# Patient Record
Sex: FEMALE | Race: WHITE | NOT HISPANIC OR LATINO | Employment: FULL TIME | URBAN - METROPOLITAN AREA
[De-identification: names, ages, dates, MRNs, and addresses within clinical notes are randomized per-mention and may not be internally consistent; named-entity substitution may affect disease eponyms.]

---

## 2023-02-25 LAB
RESPIRATORY CULTURE, CYSTIC FIBROSIS: ABNORMAL
RESPIRATORY CULTURE, CYSTIC FIBROSIS: ABNORMAL

## 2023-03-13 LAB
FUNGAL CULTURE/SMEAR: NORMAL
FUNGAL SMEAR: NORMAL

## 2023-09-30 ENCOUNTER — PHARMACY VISIT (OUTPATIENT)
Dept: PHARMACY | Facility: CLINIC | Age: 28
End: 2023-09-30
Payer: COMMERCIAL

## 2023-09-30 PROCEDURE — RXMED WILLOW AMBULATORY MEDICATION CHARGE

## 2023-10-05 ENCOUNTER — PHARMACY VISIT (OUTPATIENT)
Dept: PHARMACY | Facility: CLINIC | Age: 28
End: 2023-10-05
Payer: COMMERCIAL

## 2023-10-05 PROCEDURE — RXMED WILLOW AMBULATORY MEDICATION CHARGE

## 2023-10-09 DIAGNOSIS — K86.89 PANCREATIC INSUFFICIENCY DUE TO CYSTIC FIBROSIS (MULTI): Primary | ICD-10-CM

## 2023-10-09 DIAGNOSIS — E84.8 PANCREATIC INSUFFICIENCY DUE TO CYSTIC FIBROSIS (MULTI): Primary | ICD-10-CM

## 2023-10-10 RX ORDER — PANCRELIPASE 24000; 76000; 120000 [USP'U]/1; [USP'U]/1; [USP'U]/1
CAPSULE, DELAYED RELEASE PELLETS ORAL
Qty: 1200 CAPSULE | Refills: 6 | Status: SHIPPED | OUTPATIENT
Start: 2023-10-10 | End: 2024-10-08

## 2023-10-11 ENCOUNTER — PHARMACY VISIT (OUTPATIENT)
Dept: PHARMACY | Facility: CLINIC | Age: 28
End: 2023-10-11
Payer: COMMERCIAL

## 2023-10-18 DIAGNOSIS — E84.0 CYSTIC FIBROSIS WITH PULMONARY MANIFESTATIONS (MULTI): Primary | ICD-10-CM

## 2023-10-18 DIAGNOSIS — E84.9 CYSTIC FIBROSIS (MULTI): ICD-10-CM

## 2023-10-23 ENCOUNTER — PHARMACY VISIT (OUTPATIENT)
Dept: PHARMACY | Facility: CLINIC | Age: 28
End: 2023-10-23
Payer: COMMERCIAL

## 2023-10-23 PROCEDURE — RXMED WILLOW AMBULATORY MEDICATION CHARGE

## 2023-10-23 RX ORDER — DORNASE ALFA 1 MG/ML
SOLUTION RESPIRATORY (INHALATION)
Qty: 225 ML | Refills: 3 | Status: CANCELLED | OUTPATIENT
Start: 2023-10-23

## 2023-10-24 DIAGNOSIS — E84.9 CYSTIC FIBROSIS (MULTI): Primary | ICD-10-CM

## 2023-10-24 RX ORDER — DORNASE ALFA 1 MG/ML
SOLUTION RESPIRATORY (INHALATION)
Qty: 225 ML | Refills: 3 | Status: SHIPPED | OUTPATIENT
Start: 2023-10-24

## 2023-10-25 ENCOUNTER — PHARMACY VISIT (OUTPATIENT)
Dept: PHARMACY | Facility: CLINIC | Age: 28
End: 2023-10-25
Payer: COMMERCIAL

## 2023-11-03 ENCOUNTER — PHARMACY VISIT (OUTPATIENT)
Dept: PHARMACY | Facility: CLINIC | Age: 28
End: 2023-11-03
Payer: COMMERCIAL

## 2023-11-14 RX ORDER — ELEXACAFTOR, TEZACAFTOR, AND IVACAFTOR 100-50-75
KIT ORAL
Qty: 84 EACH | Refills: 3 | Status: CANCELLED | OUTPATIENT
Start: 2023-11-14 | End: 2024-11-13

## 2023-11-16 ENCOUNTER — PHARMACY VISIT (OUTPATIENT)
Dept: PHARMACY | Facility: CLINIC | Age: 28
End: 2023-11-16
Payer: COMMERCIAL

## 2023-11-16 DIAGNOSIS — E84.9 CYSTIC FIBROSIS (MULTI): ICD-10-CM

## 2023-11-16 PROCEDURE — RXMED WILLOW AMBULATORY MEDICATION CHARGE

## 2023-11-16 RX ORDER — ELEXACAFTOR, TEZACAFTOR, AND IVACAFTOR 100-50-75
KIT ORAL
Qty: 84 EACH | Refills: 3 | Status: SHIPPED | OUTPATIENT
Start: 2023-11-16 | End: 2024-04-18 | Stop reason: SDUPTHER

## 2023-11-21 ENCOUNTER — PHARMACY VISIT (OUTPATIENT)
Dept: PHARMACY | Facility: CLINIC | Age: 28
End: 2023-11-21
Payer: COMMERCIAL

## 2023-11-22 ENCOUNTER — PHARMACY VISIT (OUTPATIENT)
Dept: PHARMACY | Facility: CLINIC | Age: 28
End: 2023-11-22
Payer: COMMERCIAL

## 2023-11-22 ENCOUNTER — SPECIALTY PHARMACY (OUTPATIENT)
Dept: PHARMACY | Facility: CLINIC | Age: 28
End: 2023-11-22

## 2023-11-22 PROCEDURE — RXMED WILLOW AMBULATORY MEDICATION CHARGE

## 2023-11-30 ENCOUNTER — PHARMACY VISIT (OUTPATIENT)
Dept: PHARMACY | Facility: CLINIC | Age: 28
End: 2023-11-30
Payer: COMMERCIAL

## 2023-11-30 PROCEDURE — RXMED WILLOW AMBULATORY MEDICATION CHARGE

## 2023-12-22 ENCOUNTER — PHARMACY VISIT (OUTPATIENT)
Dept: PHARMACY | Facility: CLINIC | Age: 28
End: 2023-12-22
Payer: COMMERCIAL

## 2023-12-22 PROCEDURE — RXMED WILLOW AMBULATORY MEDICATION CHARGE

## 2023-12-26 PROCEDURE — RXMED WILLOW AMBULATORY MEDICATION CHARGE

## 2023-12-27 ENCOUNTER — PHARMACY VISIT (OUTPATIENT)
Dept: PHARMACY | Facility: CLINIC | Age: 28
End: 2023-12-27
Payer: COMMERCIAL

## 2024-01-03 ENCOUNTER — HOSPITAL ENCOUNTER (OUTPATIENT)
Dept: RESPIRATORY THERAPY | Facility: HOSPITAL | Age: 29
Discharge: HOME | End: 2024-01-03
Payer: COMMERCIAL

## 2024-01-03 ENCOUNTER — SOCIAL WORK (OUTPATIENT)
Dept: PEDIATRIC PULMONOLOGY | Facility: HOSPITAL | Age: 29
End: 2024-01-03
Payer: COMMERCIAL

## 2024-01-03 ENCOUNTER — SPECIALTY PHARMACY (OUTPATIENT)
Dept: PHARMACY | Facility: CLINIC | Age: 29
End: 2024-01-03

## 2024-01-03 ENCOUNTER — MULTIDISCIPLINARY VISIT (OUTPATIENT)
Dept: PEDIATRIC PULMONOLOGY | Facility: HOSPITAL | Age: 29
End: 2024-01-03
Payer: COMMERCIAL

## 2024-01-03 ENCOUNTER — DOCUMENTATION (OUTPATIENT)
Dept: PEDIATRIC PULMONOLOGY | Facility: HOSPITAL | Age: 29
End: 2024-01-03
Payer: COMMERCIAL

## 2024-01-03 VITALS
SYSTOLIC BLOOD PRESSURE: 135 MMHG | HEART RATE: 93 BPM | HEIGHT: 64 IN | RESPIRATION RATE: 19 BRPM | BODY MASS INDEX: 32.05 KG/M2 | DIASTOLIC BLOOD PRESSURE: 94 MMHG | WEIGHT: 187.72 LBS | OXYGEN SATURATION: 97 % | TEMPERATURE: 99.3 F

## 2024-01-03 DIAGNOSIS — E84.9 CYSTIC FIBROSIS (MULTI): ICD-10-CM

## 2024-01-03 DIAGNOSIS — R07.9 CHEST PAIN IN ADULT: ICD-10-CM

## 2024-01-03 DIAGNOSIS — R63.5 WEIGHT GAIN: Primary | ICD-10-CM

## 2024-01-03 DIAGNOSIS — E78.1 HYPERTRIGLYCERIDEMIA: ICD-10-CM

## 2024-01-03 PROBLEM — F32.A DEPRESSION: Status: ACTIVE | Noted: 2024-01-03

## 2024-01-03 PROBLEM — K21.9 GASTROESOPHAGEAL REFLUX DISEASE: Status: ACTIVE | Noted: 2024-01-03

## 2024-01-03 PROBLEM — M85.80 OSTEOPENIA: Status: ACTIVE | Noted: 2024-01-03

## 2024-01-03 PROBLEM — R74.8 ELEVATED SERUM GGT LEVEL: Status: ACTIVE | Noted: 2024-01-03

## 2024-01-03 PROBLEM — J32.2 CHRONIC ETHMOIDAL SINUSITIS: Status: ACTIVE | Noted: 2024-01-03

## 2024-01-03 PROBLEM — R10.2 PELVIC PAIN IN FEMALE: Status: ACTIVE | Noted: 2024-01-03

## 2024-01-03 PROBLEM — R73.02 IMPAIRED GLUCOSE TOLERANCE: Status: ACTIVE | Noted: 2024-01-03

## 2024-01-03 PROBLEM — F41.1 GAD (GENERALIZED ANXIETY DISORDER): Status: ACTIVE | Noted: 2024-01-03

## 2024-01-03 PROBLEM — D48.5 NEOPLASM OF UNCERTAIN BEHAVIOR OF SKIN: Status: ACTIVE | Noted: 2023-04-06

## 2024-01-03 PROBLEM — K59.09 CHRONIC CONSTIPATION: Status: ACTIVE | Noted: 2024-01-03

## 2024-01-03 PROBLEM — E84.8 EXOCRINE PANCREATIC MANIFESTATION OF CYSTIC FIBROSIS (MULTI): Status: ACTIVE | Noted: 2024-01-03

## 2024-01-03 PROBLEM — A49.8 PSEUDOMONAS AERUGINOSA INFECTION: Status: ACTIVE | Noted: 2024-01-03

## 2024-01-03 PROBLEM — D22.5 MELANOCYTIC NEVI OF TRUNK: Status: ACTIVE | Noted: 2023-04-06

## 2024-01-03 PROCEDURE — 87070 CULTURE OTHR SPECIMN AEROBIC: CPT | Performed by: PEDIATRICS

## 2024-01-03 PROCEDURE — 99215 OFFICE O/P EST HI 40 MIN: CPT | Performed by: PEDIATRICS

## 2024-01-03 PROCEDURE — 94010 BREATHING CAPACITY TEST: CPT

## 2024-01-03 PROCEDURE — 87102 FUNGUS ISOLATION CULTURE: CPT | Performed by: PEDIATRICS

## 2024-01-03 PROCEDURE — 99215 OFFICE O/P EST HI 40 MIN: CPT | Mod: 25 | Performed by: PEDIATRICS

## 2024-01-03 PROCEDURE — 94010 BREATHING CAPACITY TEST: CPT | Performed by: PEDIATRICS

## 2024-01-03 RX ORDER — NORETHINDRONE ACETATE AND ETHINYL ESTRADIOL 1MG-20(24)
1 KIT ORAL DAILY
COMMUNITY
End: 2024-03-11

## 2024-01-03 RX ORDER — FLUOXETINE HYDROCHLORIDE 40 MG/1
40 CAPSULE ORAL DAILY
COMMUNITY

## 2024-01-03 RX ORDER — FLUOXETINE HYDROCHLORIDE 20 MG/1
CAPSULE ORAL
COMMUNITY
Start: 2023-12-20

## 2024-01-03 RX ORDER — ALBUTEROL SULFATE 90 UG/1
2 AEROSOL, METERED RESPIRATORY (INHALATION) EVERY 4 HOURS PRN
COMMUNITY
Start: 2017-06-28

## 2024-01-03 RX ORDER — KETOCONAZOLE 20 MG/ML
SHAMPOO, SUSPENSION TOPICAL
COMMUNITY
End: 2024-04-26 | Stop reason: WASHOUT

## 2024-01-03 RX ORDER — ALBUTEROL SULFATE 0.83 MG/ML
SOLUTION RESPIRATORY (INHALATION)
COMMUNITY
Start: 2014-11-25

## 2024-01-03 RX ORDER — BUPROPION HYDROCHLORIDE 300 MG/1
300 TABLET ORAL
COMMUNITY
Start: 2023-12-20

## 2024-01-03 NOTE — PROGRESS NOTES
Alvina Pelletier M.D. [facility]            HPI    Your Annual Labs were labs were last done: 01/2023  Annual Labs are next due: 01/2023  Your last CXR was done: 11/2022  Your next CXR will be due: 11/2024  Your last DEXA scan was on: 11/2019  DEXA is next due: 2022  Your last audiology screening was: 9/2019      HPI  Interval history:   Got   Got a promotion  Got her degree  Got a dog  Built a house    Chest pain for the past 6 months  Happens a couple time a month  Left side of the chest, shooting stabbing, then goes away  Short of breath with it when it occurs  Happens when she is still, not with exertion, does not sound cardiac, but strong family history    Both of her parents have had heart attacks - Mom was 50 something at her first heart attack  Dad was in his early 40's  Gained 50 pounds on Trikafta  Blood pressure up today    Doesn't have a PCP  Has a GYN  Has a nurse pracitioner who helps with psych meds  Counselor for therapy    Currently on Wellbutrin and Prozac  Started the Wellbutrin two months ago  Hasn't noticed it helping with appetite that much      Respiratory Review of Systems:  Cough: none  Sputum:  none  Hemoptysis: none  Chest Pain: none  Wheezing: none  Other Respiratory Symptoms:   Oxygen: none  Transplant eval: none  exercise: none, has been a runner in th past    Airway Clearance:   none      GI Review of Systems:  Enzymes: on enzymes   Vitamins: on multivitamin and vitamin D   Supplements:  none  DUNIA:  none  Abdominal Pain: no recent abdominal pain  Stools:  normal  Other:      ENDO:    Birth control:    Date of Review: 2/16/2023    Recommendations:  1. Yearly transplant discussion  2. Exercise  3. Pneumococcal vaccine - ask about this  4. Bivalent COVID - ask about this  5. DEXA  6. Clarify the wellbutrin - is she taking?       Primary Cystic Fibrosis Physician: Dr. Mary Marie   Primary Care Physician: Dr. Alexus Lawrence    Initial Diagnosis: 3/27/1995  sweat chloride, 103  Genotype: [id number] / [id number]    Diagnoses:   Cystic Fibrosis lung disease  - CFTR modulator: Trikafta  - no longer taking Zithromax  - high dose ibuprofen: no    Pancreatic insufficiency  - Creon 24's w/meals and snacks  - DEKAs Plus 1 cap daily    Right lower abdominal pain  - resolved    GERD  - Famotidine twice daily    Sinusitis  - followed by Dr. Jansen   - b/l endoscopic sinus surgery performed 3/19/15; septoplasty/inferior turbinate outfracture performed  - Zyrtec 10mg daily    Osteopenia  - due for a DEXA scan    JARED  - started seeing a psych NP  - had increased anxiety on the Wellbutrin  - now on Lexapro and doing well    Procedures & Oral/IV course:  - has never required admission with IV abx for lungs  - admission inpatient 1/29-1/31/20 due to influenza B --> IVF, no IV abx  - oral abx for exacerbation: Levaquin (does not tolerate Cipro because it makes her sick)      Current Medications     Current Outpatient Medications   Medication Instructions    albuterol 2.5 mg /3 mL (0.083 %) nebulizer solution inhalation    albuterol 90 mcg/actuation inhaler 2 puffs, inhalation, Every 4 hours PRN    Blisovi 24 Fe 1 mg-20 mcg (24)/75 mg (4) tablet 1 tablet, oral, Daily    buPROPion XL (WELLBUTRIN XL) 300 mg, oral, Daily before breakfast    cetirizine (ZyrTEC) 10 mg capsule oral    Creon 24,000-76,000 -120,000 unit capsule TAKE 4-5 CAPSULES WITH MEALS AND SNACKS (3 MEALS PER DAY/ 2-3 SNACKS PER DAY)    dornase Alpha (Pulmozyme) 1 mg/mL nebulizer solution Inhale one (1) vial via nebulizer once a day    elexacaftor-tezacaftor-ivacaft (Trikafta) 100-50-75 mg tablet TAKE TWO (2) ORANGE TABLETS BY MOUTH IN THE MORNING AND ONE (1) BLUE TABLET BY MOUTH IN THE EVENING. TAKE 12 HOURS APART WITH FATTY FOODS.    famotidine (Pepcid) 20 mg tablet TAKE ONE (1) TABLET BY MOUTH EVERY 12 HOURS.    FLUoxetine (PROzac) 20 mg capsule TAKE 1 ORAL CAPSULE ONCE A DAY WITH 40MG (TOTAL 60MG)    FLUoxetine (PROZAC) 40  "mg, oral, Daily    ketoconazole (NIZOral) 2 % shampoo APPLY DAILY TOPICALLY TO SCALP IN THE SHOWER       Physical Examination     BP (!) 135/94 (BP Location: Right arm, Patient Position: Sitting, BP Cuff Size: Large adult)   Pulse 93   Temp 37.4 °C (99.3 °F) (Oral)   Resp 19   Ht 1.625 m (5' 3.98\")   Wt 85.2 kg (187 lb 11.6 oz)   SpO2 97%   BMI 32.25 kg/m²     GENERAL APPEARANCE: Healthy appearing, in no acute distress  SKIN: no rashes  HEAD, EYES, EARS, NOSE, THROAT:  Conjunctiva and sclera clear. Tympanic membranes normal. No rhinitis, nasal mucosa normal without polyps. Oropharynx unremarkable  NECK: No lymphadenopathy  CHEST: AP Diameter normal. No retractions. Auscultation reveals good air exchange without crackles or wheeze.   HEART: Normal rhythm, without murmur  ABDOMEN: Not distended. Normal bowel sounds. Soft and non-tender to palpation, without hepatomegaly or splenomegaly. No masses  EXTREMITIES: Without cyanosis or edema.   LYMPHATIC: No lymphadenopathy  MUSCULOSKELETAL: Unremarkable   NEUROLOGIC: Grossly intact        CF Sputum Culture     No results found for: \"AFBCX\"   Respiratory Culture, Cystic Fibrosis   Date/Time Value Ref Range Status   01/03/2024 08:50 AM (3+) Moderate Normal throat kaushik  Final   01/03/2024 08:50 AM (A)  Final    (2+) Few Methicillin Susceptible Staphylococcus aureus (MSSA)       Susceptibility data from last 90 days.  Collected Specimen Info Organism Clindamycin Erythromycin Oxacillin Tetracycline Trimethoprim/Sulfamethoxazole Vancomycin   01/03/24 Fluid from SPUTUM Methicillin Susceptible Staphylococcus aureus (MSSA) R R S S S S     Normal throat kaushik             Problem List Items Addressed This Visit       Cystic fibrosis (CMS/[facility])    Overview     Primary Cystic Fibrosis Physician: Dr. Mary Marie   Primary Care Physician: Dr. Alexus Lawrence     Initial Diagnosis: 3/27/1995 sweat chloride, 103  Genotype: [id number] / R553X    Respiratory: 02/16/23  - " Baseline FEV1: 109% November 2022  - Airway clearance: aerobika given in November 2022 (was running)  - Hypertonic saline: No  - Pulmozyme: yes Once Day  - Inhaled antibiotics: No (discontinued tobramycin)  - Other inhaled meds: yes albuterol solution  - Oxygen: No  Recommendation/Summary: Daily exercise and/or airway clearance. Consider [place] if needed in the future for pseudomonas.          Relevant Orders    AFB Culture/Smear    Fungal Culture/Smear (Completed)    Respiratory Culture, Cystic Fibrosis (Completed)    CBC and Auto Differential (Completed)    Comprehensive Metabolic Panel (Completed)    Gamma-Glutamyl Transferase (Completed)    Glucose Tolerance Test, 2 hour (Non-Pregnancy) (Completed)    Immunoglobulin IgE    Vitamin E    Vitamin D 25-Hydroxy,Total (for eval of Vitamin D levels) (Completed)    Vitamin A    Urinalysis with Reflex Microscopic (Completed)    DCP; [facility]; DCP - Miscellaneous Test     Other Visit Diagnoses       Weight gain    -  Primary    Relevant Orders    TSH with reflex to Free T4 if abnormal (Completed)    Chest pain in adult        Relevant Orders    Echocardiogram Stress Test    Hypertriglyceridemia               # Cystic fibrosis without exacerbation  - grows PSA intermittently, as well as haemophilus and staph     ______________________________________________________________________________________  sick plan:  - can do doxy or Levaquin with good results  ______________________________________________________________________________________     #chest pain  - will order exercise stress echo for baseline  - strong family history, recent weight gain, high triglycerides    # weight gain  - will see how we can get her semaglutide or tirzepatide      # anxiety   - started seeing a psych NP  - had increased anxiety on the Wellbutrin  - now on Lexapro and doing well     #. exocrine pancreatic insufficiency  - enzymes     # Right lower abdominal pain  - resolved, but is  now back  - changing her diet last time helped, will do this again     #. Osteopenia  - due for a DEXA scan     # high blood pressure without the diagnosis of HTN  - discussed today that she needs to get a blood pressure cuff     # ringing in right ear, diminished hearing  - start nasal steroid, follow up on this next time - did not discuss today       Long talk today about her weight gain, her appetite is very high since starting Trikafta.  Will look into Semaglutide or tirzepatide for her  We need to better control her coronary atherosclerotic risk factors     Encouraged patient to resume exercise  Follow-up:  Visit date not found     Mary Marie MD PhD   01/03/2024

## 2024-01-03 NOTE — PATIENT INSTRUCTIONS
"    It was very nice to see you today. We can offer you in-person and \"virtual\" appointments with your cystic fibrosis provider.    If you need to cancel or schedule an appointment, please call the  at the [facility] at [phone] between the hours of 8 AM and 5 PM, Monday-Friday.    To reach the CF nurse, Melina, please call [phone]. Melina has a secure voice mail account if you want to leave a message.    If you need to speak with an adult cystic fibrosis provider between the hours of 5 PM and 8 AM (overnight) or anytime on Saturday or Sunday, please call [phone]. When you call this number, you will be connected to an  with the [facility] and [facility] answering service. You should tell the  that you're an adult with cystic fibrosis who needs to speak to the \"pediatric pulmonary doctor on-call.\" The  will take your contact information. You should then expect a call back from the cystic fibrosis doctor on-call within a short period of time.      Plan for this visit:  Let me investigate Zepbound and  pharmacy/insurance  Let me decide how to deal with your chest pain  Return in 3 months  We'll call you by the end of the week          Next appointment: Visit date not found    "

## 2024-01-03 NOTE — PROGRESS NOTES
"SW met patient in outpatient clinic.. Patient reported no changes or updates, just a \"busy year\". SW will meet patient during next visit to complete annual. MEAGAN will continue to follow.   -JOMAR Malcolm   "

## 2024-01-03 NOTE — PROGRESS NOTES
Respiratory Check In:    Tooth brush given - Reviewed recommendations    Big year! Got , build a house, got a puppy!

## 2024-01-03 NOTE — PROGRESS NOTES
[facility] PATIENT REASSESSMENT NOTE    Venessa Duran is a 28 y.o. year-old female seen in clinic .    [facility] SUPPLIED MEDICATION:  Jesus Duran's care will be continued with the referring prescriber.     GENERAL ASSESSMENT:  Are there any changes to your home medications, OTCs or supplements? No changes reported    Current Outpatient Medications on File Prior to Visit   Medication Sig Dispense Refill    albuterol 2.5 mg /3 mL (0.083 %) nebulizer solution Inhale.      albuterol 90 mcg/actuation inhaler Inhale 2 puffs every 4 hours if needed.      Blisovi 24 Fe 1 mg-20 mcg (24)/75 mg (4) tablet Take 1 tablet by mouth once daily.      buPROPion XL (Wellbutrin XL) 300 mg 24 hr tablet Take 1 tablet (300 mg) by mouth once daily in the morning. Take before meals.      cetirizine (ZyrTEC) 10 mg capsule Take by mouth.      Creon 24,000-76,000 -120,000 unit capsule TAKE 4-5 CAPSULES WITH MEALS AND SNACKS (3 MEALS PER DAY/ 2-3 SNACKS PER DAY) 1200 capsule 6    dornase Alpha (Pulmozyme) 1 mg/mL nebulizer solution Inhale one (1) vial via nebulizer once a day 225 mL 3    elexacaftor-tezacaftor-ivacaft (Trikafta) 100-50-75 mg tablet TAKE TWO (2) ORANGE TABLETS BY MOUTH IN THE MORNING AND ONE (1) BLUE TABLET BY MOUTH IN THE EVENING. TAKE 12 HOURS APART WITH FATTY FOODS. 84 each 3    famotidine (Pepcid) 20 mg tablet TAKE ONE (1) TABLET BY MOUTH EVERY 12 HOURS. 180 tablet 3    FLUoxetine (PROzac) 20 mg capsule TAKE 1 ORAL CAPSULE ONCE A DAY WITH 40MG (TOTAL 60MG)      FLUoxetine (PROzac) 40 mg capsule Take 1 capsule (40 mg) by mouth once daily.      ketoconazole (NIZOral) 2 % shampoo APPLY DAILY TOPICALLY TO SCALP IN THE SHOWER       No current facility-administered medications on file prior to visit.       Do you have any new allergies? no new allergies reported    Allergies as of 01/03/2024 - Reviewed 01/03/2024   Allergen Reaction Noted    Bee pollen Runny nose 01/03/2024    Cat  dander Runny nose 01/03/2024    Ciprofloxacin Nausea/vomiting 06/28/2017    House dust Runny nose 01/03/2024       Have you been diagnosed with any new medical conditions? no new reported medical conditions    Patient Active Problem List   Diagnosis    Chronic constipation    Chronic ethmoidal sinusitis    Cystic fibrosis (CMS/HCC)    Depression    Elevated serum GGT level    Exocrine pancreatic manifestation of cystic fibrosis (CMS/HCC)    JARED (generalized anxiety disorder)    Gastroesophageal reflux disease    Impaired glucose tolerance    Melanocytic nevi of trunk    Neoplasm of uncertain behavior of skin    Osteopenia    Pelvic pain in female    Pseudomonas aeruginosa infection       CFTR Genotype:[id number] and [id number]  Current CFTR Modulator: Trikafta  Dose:   Blue tab in the morning and 2 orange tabs at night, because of daytime headaches, almost the whole time like this  Vertex GPS: Yes     TOLERANCE:   Have you experienced any side effects from this medication?  sweat more, weight gain    Are there any changes to current therapy regimen? No changes reported    EFFICACY:    Have you developed any new symptoms of your condition? No changes reported, less salty sweat, but sweats more    How do you feel your medication is affecting your disease state? Still helping, less cough, less drainage, less illness    GOALS:  Your goals of therapy are: Improved quality of life, Improve/maintain lung function, and Reduce frequency of illness    COMPLIANCE / ADHERENCE:  Have you had any unplanned missed doses?No  If yes, how often do you miss doses and is there a particular contributing reason? Miss med only once a month    What barriers to adherence does the patient have? (Answer Y/N for all):  Patient has a difficult time remembering to take medications? No  Difficult administration technique?No breakfast, dinner trikafta  Medication cost? No  Patient does not think medication is beneficial?No  Other?   What actions were  taken to address barriers? Weekly pill box currently, Interested in extra pill box so that she can fill 2 weeks at a time. An additional pill box was provided at this visit    Does the patient have any barriers to self-administration (including physical and mental)? No  List barriers:   Describe actions taken to mitigate barriers:    Health Maintenance  Health Maintenance Due   Topic Date Due    Yearly Adult Physical  Never done    HIV Screening  Never done    Derm Melanoma Skin Check  Never done    Hepatitis C Screening  Never done    DTaP/Tdap/Td Vaccines (7 - Td or Tdap) 03/05/2020    COVID-19 Vaccine (4 - Moderna series) 01/01/2022    Influenza Vaccine (1) 09/01/2023       Labs  Lab Results   Component Value Date    WBC 8.5 01/19/2023    HGB 13.4 01/19/2023    HCT 40.9 01/19/2023     01/19/2023    CHOL 151 01/19/2023    TRIG 155 (H) 01/19/2023    HDL 44.5 01/19/2023    ALT 19 01/19/2023    AST 20 01/19/2023     01/19/2023    K 3.6 01/19/2023     01/19/2023    CREATININE 0.65 01/19/2023    BUN 10 01/19/2023    CO2 24 01/19/2023    TSH 3.18 01/19/2023    INR 1.1 01/19/2023    HGBA1C 5.4 01/19/2023       Pulmonary Function Tests     FEV1   Date/Time Value Ref Range Status   01/03/2024 04:22 PM 3.58 liters      Comment:     111%          PATIENT MANAGEMENT:    Do you have any questions regarding your medications, or care? no additional questions    Do you have any concerns with access to your medications? Concern with co-pay    How would you classify your Quality of Life? Doing well    Fills medications at:  Specialty    How would you describe your satisfaction with  Specialty Pharmacy? Issue with servicepast issues, better now    Do you have any additional suggestions to improve  Specialty Pharmacy services: suggestion:  Weren't calling, sent a med with $8000, didn't tell her, didn't bill it right    IMPRESSION/PLAN:  Is patient high risk (potential patients:  pregnancy, geriatric,  pediatric)?  n/a  Is laboratory follow-up needed? Liver function tests due annually. Last done: Jan 2023, due this month  Is a clinical intervention needed? n/a    Additional comments: Venessa reported being charged $30 copay from pharmacy, upon further investigation this is a Pulmozyme co-pay. Investigating if Pulmozyme co-pay program being applied.      Lynn Yanes, PharmD   1/3/2024 4:33 PM

## 2024-01-03 NOTE — PROGRESS NOTES
Nurse Assessment:     SOB:  3/10 with laying down   2.    O2:  n/a  3.    Mediport: n/a  4.    Flu vaccine: 10/2023  5.    COVID  Booster: denied   6.    Pt discussion/chief complaint/agenda: URI - started with ST and runny nose x2 weeks ago. Still has a runny nose and SOB.   LR pelvic pain - have appt in May or April with GYN. Dull intermittent pain worse pain 7/10, usual pain 4/10 - not associated with menstrual cycle.

## 2024-01-06 ENCOUNTER — DOCUMENTATION (OUTPATIENT)
Dept: PEDIATRIC PULMONOLOGY | Facility: HOSPITAL | Age: 29
End: 2024-01-06

## 2024-01-06 ENCOUNTER — LAB (OUTPATIENT)
Dept: LAB | Facility: LAB | Age: 29
End: 2024-01-06
Payer: COMMERCIAL

## 2024-01-06 DIAGNOSIS — E84.9 CYSTIC FIBROSIS (MULTI): ICD-10-CM

## 2024-01-06 DIAGNOSIS — R73.09 ABNORMAL ORAL GLUCOSE TOLERANCE TEST: Primary | ICD-10-CM

## 2024-01-06 DIAGNOSIS — R63.5 WEIGHT GAIN: ICD-10-CM

## 2024-01-06 LAB
25(OH)D3 SERPL-MCNC: 35 NG/ML (ref 31–100)
ALBUMIN SERPL-MCNC: 4.1 G/DL (ref 3.5–5)
ALP BLD-CCNC: 69 U/L (ref 35–125)
ALT SERPL-CCNC: 16 U/L (ref 5–40)
ANION GAP SERPL CALC-SCNC: 12 MMOL/L
APPEARANCE UR: CLEAR
AST SERPL-CCNC: 15 U/L (ref 5–40)
BACTERIA SPT CF RESP CULT: ABNORMAL
BACTERIA SPT CF RESP CULT: ABNORMAL
BASOPHILS # BLD AUTO: 0.06 X10*3/UL (ref 0–0.1)
BASOPHILS NFR BLD AUTO: 0.6 %
BILIRUB SERPL-MCNC: 0.4 MG/DL (ref 0.1–1.2)
BILIRUB UR STRIP.AUTO-MCNC: NEGATIVE MG/DL
BUN SERPL-MCNC: 16 MG/DL (ref 8–25)
CALCIUM SERPL-MCNC: 9.3 MG/DL (ref 8.5–10.4)
CHLORIDE SERPL-SCNC: 101 MMOL/L (ref 97–107)
CO2 SERPL-SCNC: 25 MMOL/L (ref 24–31)
COLOR UR: NORMAL
CREAT SERPL-MCNC: 0.7 MG/DL (ref 0.4–1.6)
EOSINOPHIL # BLD AUTO: 0.16 X10*3/UL (ref 0–0.7)
EOSINOPHIL NFR BLD AUTO: 1.7 %
ERYTHROCYTE [DISTWIDTH] IN BLOOD BY AUTOMATED COUNT: 13.3 % (ref 11.5–14.5)
GFR SERPL CREATININE-BSD FRML MDRD: >90 ML/MIN/1.73M*2
GGT SERPL-CCNC: 10 U/L (ref 5–55)
GLUCOSE 2H P GLC SERPL-MCNC: 161 MG/DL (ref 65–139)
GLUCOSE P FAST SERPL-MCNC: 201 MG/DL (ref 69–99)
GLUCOSE SERPL-MCNC: 84 MG/DL (ref 65–99)
GLUCOSE UR STRIP.AUTO-MCNC: NORMAL MG/DL
HCT VFR BLD AUTO: 41.6 % (ref 36–46)
HGB BLD-MCNC: 13.7 G/DL (ref 12–16)
IGE SERPL-ACNC: 9 IU/ML (ref 0–214)
IMM GRANULOCYTES # BLD AUTO: 0.03 X10*3/UL (ref 0–0.7)
IMM GRANULOCYTES NFR BLD AUTO: 0.3 % (ref 0–0.9)
KETONES UR STRIP.AUTO-MCNC: NEGATIVE MG/DL
LEUKOCYTE ESTERASE UR QL STRIP.AUTO: NEGATIVE
LYMPHOCYTES # BLD AUTO: 3.21 X10*3/UL (ref 1.2–4.8)
LYMPHOCYTES NFR BLD AUTO: 33.6 %
MCH RBC QN AUTO: 29.5 PG (ref 26–34)
MCHC RBC AUTO-ENTMCNC: 32.9 G/DL (ref 32–36)
MCV RBC AUTO: 90 FL (ref 80–100)
MONOCYTES # BLD AUTO: 0.63 X10*3/UL (ref 0.1–1)
MONOCYTES NFR BLD AUTO: 6.6 %
NEUTROPHILS # BLD AUTO: 5.45 X10*3/UL (ref 1.2–7.7)
NEUTROPHILS NFR BLD AUTO: 57.2 %
NITRITE UR QL STRIP.AUTO: NEGATIVE
NRBC BLD-RTO: 0 /100 WBCS (ref 0–0)
PH UR STRIP.AUTO: 5.5 [PH]
PLATELET # BLD AUTO: 416 X10*3/UL (ref 150–450)
POTASSIUM SERPL-SCNC: 4.6 MMOL/L (ref 3.4–5.1)
PROT SERPL-MCNC: 6.7 G/DL (ref 5.9–7.9)
PROT UR STRIP.AUTO-MCNC: NEGATIVE MG/DL
RBC # BLD AUTO: 4.65 X10*6/UL (ref 4–5.2)
RBC # UR STRIP.AUTO: NEGATIVE /UL
SODIUM SERPL-SCNC: 138 MMOL/L (ref 133–145)
SP GR UR STRIP.AUTO: 1.02
TSH SERPL DL<=0.05 MIU/L-ACNC: 2.45 MIU/L (ref 0.27–4.2)
UROBILINOGEN UR STRIP.AUTO-MCNC: NORMAL MG/DL
WBC # BLD AUTO: 9.5 X10*3/UL (ref 4.4–11.3)

## 2024-01-06 PROCEDURE — 85025 COMPLETE CBC W/AUTO DIFF WBC: CPT

## 2024-01-06 PROCEDURE — 82785 ASSAY OF IGE: CPT

## 2024-01-06 PROCEDURE — 81003 URINALYSIS AUTO W/O SCOPE: CPT

## 2024-01-06 PROCEDURE — 80053 COMPREHEN METABOLIC PANEL: CPT

## 2024-01-06 PROCEDURE — 82306 VITAMIN D 25 HYDROXY: CPT

## 2024-01-06 PROCEDURE — 82977 ASSAY OF GGT: CPT

## 2024-01-06 PROCEDURE — 84590 ASSAY OF VITAMIN A: CPT

## 2024-01-06 PROCEDURE — 36415 COLL VENOUS BLD VENIPUNCTURE: CPT

## 2024-01-06 PROCEDURE — 82950 GLUCOSE TEST: CPT

## 2024-01-06 PROCEDURE — 82947 ASSAY GLUCOSE BLOOD QUANT: CPT

## 2024-01-06 PROCEDURE — 84446 ASSAY OF VITAMIN E: CPT

## 2024-01-06 PROCEDURE — 84443 ASSAY THYROID STIM HORMONE: CPT

## 2024-01-06 NOTE — PROGRESS NOTES
Med Rec - Admission Patient has gained 50 pounds since starting Trikafta.  She now has an abnormal OGTT, which will be repeated. She is having chest pain, and has an elevated blood pressure in the office.  For multiple reasons, I believe that tirzepatide would be helpful for her.  We'll see if we can get this covered.  She will need teaching on how to give.  I've spoken with her extensively about why I think this medication would be helpful to her, and the need to watch her bowels closely. She was interested in trying in.

## 2024-01-08 ENCOUNTER — TELEPHONE (OUTPATIENT)
Dept: PEDIATRIC PULMONOLOGY | Facility: HOSPITAL | Age: 29
End: 2024-01-08
Payer: COMMERCIAL

## 2024-01-08 DIAGNOSIS — J01.40 ACUTE NON-RECURRENT PANSINUSITIS: Primary | ICD-10-CM

## 2024-01-08 RX ORDER — LEVOFLOXACIN 750 MG/1
750 TABLET ORAL DAILY
Qty: 10 TABLET | Refills: 0 | Status: SHIPPED | OUTPATIENT
Start: 2024-01-08 | End: 2024-01-18

## 2024-01-08 NOTE — TELEPHONE ENCOUNTER
RN called pt to discuss the recommendation for the stress echo (see results note) Pt was seen last week and had a bit of an URI. Pt was advised by provider to call this week if no better. Pt stated she is not any better. S/S:  stuffy/runny nose, HA ~ started with a sore throat x2.5 weeks. No improvement other then the ST subsided. Pt usually uses Levaquin (has had both strengths) - [facility] in [place].

## 2024-01-08 NOTE — TELEPHONE ENCOUNTER
Per Dr. Marie   Ok for Levaquin 750 mg daily for 10 days      RN called with recommendation: Pt agreed with the plan. Advised to call next week with an update. Pt verbalized understanding and agree.

## 2024-01-08 NOTE — TELEPHONE ENCOUNTER
RD called pt. To discuss OGTT results. Pt. Explained that the 201 blood sugar value was her 1-hour result. Her blood was drawn at 1 hour and 2 hours. Her fasting glucose was 84 (per CMP). RD explained that 161 results show IGT, but not diabetes. RD agreed to communicate this with Dr. Marie. Pt. Had no other concerns at this time.

## 2024-01-09 DIAGNOSIS — E84.9 CYSTIC FIBROSIS (MULTI): Primary | ICD-10-CM

## 2024-01-10 LAB
A-TOCOPHEROL VIT E SERPL-MCNC: 15.6 MG/L (ref 5.5–18)
ANNOTATION COMMENT IMP: NORMAL
BETA+GAMMA TOCOPHEROL SERPL-MCNC: 0.2 MG/L (ref 0–6)
RETINYL PALMITATE SERPL-MCNC: <0.02 MG/L (ref 0–0.1)
SCAN RESULT: NORMAL
VIT A SERPL-MCNC: 0.61 MG/L (ref 0.3–1.2)

## 2024-01-15 PROCEDURE — RXMED WILLOW AMBULATORY MEDICATION CHARGE

## 2024-01-16 ENCOUNTER — APPOINTMENT (OUTPATIENT)
Dept: CARDIOLOGY | Facility: HOSPITAL | Age: 29
End: 2024-01-16
Payer: COMMERCIAL

## 2024-01-16 ENCOUNTER — PHARMACY VISIT (OUTPATIENT)
Dept: PHARMACY | Facility: CLINIC | Age: 29
End: 2024-01-16
Payer: COMMERCIAL

## 2024-01-19 ENCOUNTER — APPOINTMENT (OUTPATIENT)
Dept: CARDIOLOGY | Facility: HOSPITAL | Age: 29
End: 2024-01-19
Payer: COMMERCIAL

## 2024-01-19 PROCEDURE — RXMED WILLOW AMBULATORY MEDICATION CHARGE

## 2024-01-22 LAB
FUNGUS SPEC CULT: NORMAL
FUNGUS SPEC FUNGUS STN: NORMAL

## 2024-01-23 PROCEDURE — RXMED WILLOW AMBULATORY MEDICATION CHARGE

## 2024-01-23 NOTE — PROGRESS NOTES
Venessa Duran is a 28 y.o. female on day 0 of admission presenting with No Principal Problem: There is no principal problem currently on the Problem List. Please update the Problem List and refresh..    Subjective   ***       Objective     Physical Exam    Last Recorded Vitals  There were no vitals taken for this visit.  Intake/Output last 3 Shifts:  No intake/output data recorded.    Relevant Results  {If you would like to pull in Medications, type .meds     If you would like to pull in Lab results for the last 24 hours, type .[id number]    If you would like to pull in Imaging results, type .[id number] :99}    {Link to Stroke Scoring tools - Link :99}                       Assessment/Plan   Active Problems:  There are no active Hospital Problems.    ***     {This patient does not have an ACP note on file for this encounter, please fill one out - Advance Care Planning Activity :99}      Anai Hebert, RT

## 2024-01-24 ENCOUNTER — PHARMACY VISIT (OUTPATIENT)
Dept: PHARMACY | Facility: CLINIC | Age: 29
End: 2024-01-24
Payer: COMMERCIAL

## 2024-01-26 ENCOUNTER — SPECIALTY PHARMACY (OUTPATIENT)
Dept: PHARMACY | Facility: CLINIC | Age: 29
End: 2024-01-26

## 2024-01-26 ENCOUNTER — HOSPITAL ENCOUNTER (OUTPATIENT)
Dept: CARDIOLOGY | Facility: HOSPITAL | Age: 29
Discharge: HOME | End: 2024-01-26
Payer: COMMERCIAL

## 2024-01-26 DIAGNOSIS — R07.9 CHEST PAIN IN ADULT: ICD-10-CM

## 2024-01-26 PROCEDURE — 93016 CV STRESS TEST SUPVJ ONLY: CPT | Performed by: INTERNAL MEDICINE

## 2024-01-26 PROCEDURE — 93350 STRESS TTE ONLY: CPT | Performed by: INTERNAL MEDICINE

## 2024-01-26 PROCEDURE — 93017 CV STRESS TEST TRACING ONLY: CPT

## 2024-01-26 PROCEDURE — 93018 CV STRESS TEST I&R ONLY: CPT | Performed by: INTERNAL MEDICINE

## 2024-02-01 ENCOUNTER — PHARMACY VISIT (OUTPATIENT)
Dept: PHARMACY | Facility: CLINIC | Age: 29
End: 2024-02-01
Payer: COMMERCIAL

## 2024-02-13 ENCOUNTER — PHARMACY VISIT (OUTPATIENT)
Dept: PHARMACY | Facility: CLINIC | Age: 29
End: 2024-02-13
Payer: COMMERCIAL

## 2024-02-13 PROCEDURE — RXMED WILLOW AMBULATORY MEDICATION CHARGE

## 2024-02-19 ENCOUNTER — SPECIALTY PHARMACY (OUTPATIENT)
Dept: PHARMACY | Facility: CLINIC | Age: 29
End: 2024-02-19

## 2024-02-20 ENCOUNTER — MULTIDISCIPLINARY MEETING (OUTPATIENT)
Dept: PEDIATRIC PULMONOLOGY | Facility: HOSPITAL | Age: 29
End: 2024-02-20

## 2024-02-22 NOTE — PROGRESS NOTES
Venessa Mastromatteo  1995    Venessa Mastalcidesatteo  1995      Current Outpatient Medications on File Prior to Visit   Medication Sig Dispense Refill    albuterol 2.5 mg /3 mL (0.083 %) nebulizer solution Inhale.      albuterol 90 mcg/actuation inhaler Inhale 2 puffs every 4 hours if needed.      Blisovi 24 Fe 1 mg-20 mcg (24)/75 mg (4) tablet Take 1 tablet by mouth once daily.      buPROPion XL (Wellbutrin XL) 300 mg 24 hr tablet Take 1 tablet (300 mg) by mouth once daily in the morning. Take before meals.      cetirizine (ZyrTEC) 10 mg capsule Take by mouth.      Creon 24,000-76,000 -120,000 unit capsule TAKE 4-5 CAPSULES WITH MEALS AND SNACKS (3 MEALS PER DAY/ 2-3 SNACKS PER DAY) 1200 capsule 6    dornase Alpha (Pulmozyme) 1 mg/mL nebulizer solution Inhale one (1) vial via nebulizer once a day 225 mL 3    elexacaftor-tezacaftor-ivacaft (Trikafta) 100-50-75 mg tablet TAKE TWO (2) ORANGE TABLETS BY MOUTH IN THE MORNING AND ONE (1) BLUE TABLET BY MOUTH IN THE EVENING. TAKE 12 HOURS APART WITH FATTY FOODS. 84 each 3    famotidine (Pepcid) 20 mg tablet TAKE ONE (1) TABLET BY MOUTH EVERY 12 HOURS. 180 tablet 3    FLUoxetine (PROzac) 20 mg capsule TAKE 1 ORAL CAPSULE ONCE A DAY WITH 40MG (TOTAL 60MG)      FLUoxetine (PROzac) 40 mg capsule Take 1 capsule (40 mg) by mouth once daily.      ketoconazole (NIZOral) 2 % shampoo APPLY DAILY TOPICALLY TO SCALP IN THE SHOWER      [] tirzepatide 2.5 mg/0.5 mL pen injector Inject 2.5 mg under the skin every 7 days. 2 mL 0     No current facility-administered medications on file prior to visit.     Allergies   Allergen Reactions    Bee Pollen Runny nose    Cat Dander Runny nose    Ciprofloxacin Nausea/vomiting     Violent emesis with cipro.    House Dust Runny nose       Metabolic Parameters     Sodium   Date/Time Value Ref Range Status   2024 08:47  133 - 145 mmol/L Final     Potassium   Date/Time Value Ref Range Status   2024 08:47 AM 4.6 3.4 - 5.1 mmol/L  Final     Chloride   Date/Time Value Ref Range Status   01/06/2024 08:47  97 - 107 mmol/L Final     Bicarbonate   Date/Time Value Ref Range Status   01/06/2024 08:47 AM 25 24 - 31 mmol/L Final     Anion Gap   Date/Time Value Ref Range Status   01/06/2024 08:47 AM 12 <=19 mmol/L Final     Urea Nitrogen   Date/Time Value Ref Range Status   01/06/2024 08:47 AM 16 8 - 25 mg/dL Final     Creatinine   Date/Time Value Ref Range Status   01/06/2024 08:47 AM 0.70 0.40 - 1.60 mg/dL Final     GFR Female   Date/Time Value Ref Range Status   01/19/2023 08:28 AM >90 >90 mL/min/1.73m2 Final     Comment:      CALCULATIONS OF ESTIMATED GFR ARE PERFORMED   USING THE 2021 CKD-EPI STUDY REFIT EQUATION   WITHOUT THE RACE VARIABLE FOR THE IDMS-TRACEABLE   CREATININE METHODS.    https://jasn.asnjournals.org/content/early/2021/09/22/ASN.2919002759       Glucose   Date/Time Value Ref Range Status   01/06/2024 08:47 AM 84 65 - 99 mg/dL Final     Calcium   Date/Time Value Ref Range Status   01/06/2024 08:47 AM 9.3 8.5 - 10.4 mg/dL Final       Hematologic Parameters     WBC   Date/Time Value Ref Range Status   01/06/2024 08:47 AM 9.5 4.4 - 11.3 x10*3/uL Final     Neutrophils Absolute   Date/Time Value Ref Range Status   01/06/2024 08:47 AM 5.45 1.20 - 7.70 x10*3/uL Final     Comment:     Percent differential counts (%) should be interpreted in the context of the absolute cell counts (cells/uL).     Neutrophils %   Date/Time Value Ref Range Status   01/06/2024 08:47 AM 57.2 40.0 - 80.0 % Final     Lymphocytes %   Date/Time Value Ref Range Status   01/06/2024 08:47 AM 33.6 13.0 - 44.0 % Final     Monocytes %   Date/Time Value Ref Range Status   01/06/2024 08:47 AM 6.6 2.0 - 10.0 % Final     Basophils %   Date/Time Value Ref Range Status   01/06/2024 08:47 AM 0.6 0.0 - 2.0 % Final     Eosinophils Absolute   Date/Time Value Ref Range Status   01/06/2024 08:47 AM 0.16 0.00 - 0.70 x10*3/uL Final     Eosinophils %   Date/Time Value Ref Range  Status   01/06/2024 08:47 AM 1.7 0.0 - 6.0 % Final     Hemoglobin   Date/Time Value Ref Range Status   01/06/2024 08:47 AM 13.7 12.0 - 16.0 g/dL Final     Hematocrit   Date/Time Value Ref Range Status   01/06/2024 08:47 AM 41.6 36.0 - 46.0 % Final     RBC   Date/Time Value Ref Range Status   01/06/2024 08:47 AM 4.65 4.00 - 5.20 x10*6/uL Final     MCV   Date/Time Value Ref Range Status   01/06/2024 08:47 AM 90 80 - 100 fL Final     MCHC   Date/Time Value Ref Range Status   01/06/2024 08:47 AM 32.9 32.0 - 36.0 g/dL Final     Platelets   Date/Time Value Ref Range Status   01/06/2024 08:47  150 - 450 x10*3/uL Final       Fat-Soluble Vitamin Levels     Vitamin D, 25-Hydroxy, Total   Date/Time Value Ref Range Status   01/06/2024 08:47 AM 35 31 - 100 ng/mL Final     Vitamin A (Retinol)   Date/Time Value Ref Range Status   01/06/2024 08:47 AM 0.61 0.30 - 1.20 mg/L Final     Vitamin A, Interpretation   Date/Time Value Ref Range Status   01/06/2024 08:47 AM Normal  Final     Comment:       This test was developed and its performance characteristics   determined by [facility]. It has not been cleared or   approved by the US Food and Drug Administration. This test was   performed in a CLIA certified laboratory and is intended for   clinical purposes.  Performed By: [facility]  [address] [place] [id number]  : Ryland Lee MD, PhD  CLIA Number: [id number]     Vitamin E (Alpha-Tocopherol)   Date/Time Value Ref Range Status   01/06/2024 08:47 AM 15.6 5.5 - 18.0 mg/L Final     Comment:       This test was developed and its performance characteristics   determined by [facility]. It has not been cleared or   approved by the US Food and Drug Administration. This test was   performed in a CLIA certified laboratory and is intended for   clinical purposes.     Vitamin E (Gamma-Tocopherol)   Date/Time Value Ref Range Status   01/06/2024 08:47 AM 0.2 0.0 - 6.0 mg/L Final      Comment:     Performed By: [facility]  [address] [place] [id number]  : Ryland Lee MD, PhD  CLIA Number: [id number]       LFT and Associated Parameters     AST   Date/Time Value Ref Range Status   01/06/2024 08:47 AM 15 5 - 40 U/L Final     ALT   Date/Time Value Ref Range Status   01/06/2024 08:47 AM 16 5 - 40 U/L Final     Alkaline Phosphatase   Date/Time Value Ref Range Status   01/06/2024 08:47 AM 69 35 - 125 U/L Final     Bilirubin, Total   Date/Time Value Ref Range Status   01/06/2024 08:47 AM 0.4 0.1 - 1.2 mg/dL Final     Bilirubin, Direct   Date/Time Value Ref Range Status   02/24/2021 01:13 PM 0.1 0.0 - 0.3 mg/dL Final     GGT   Date/Time Value Ref Range Status   01/06/2024 08:47 AM 10 5 - 55 U/L Final     Albumin   Date/Time Value Ref Range Status   01/06/2024 08:47 AM 4.1 3.5 - 5.0 g/dL Final     Total Protein   Date/Time Value Ref Range Status   01/06/2024 08:47 AM 6.7 5.9 - 7.9 g/dL Final       Coagulation Parameters     Protime   Date/Time Value Ref Range Status   01/19/2023 08:28 AM 13.2 9.8 - 13.4 sec Final     INR   Date/Time Value Ref Range Status   01/19/2023 08:28 AM 1.1 0.9 - 1.1 Final       Diabetes Laboratory Tests     Hemoglobin A1C   Date/Time Value Ref Range Status   01/19/2023 08:28 AM 5.4 % Final     Comment:          Diagnosis of Diabetes-Adults   Non-Diabetic: < or = 5.6%   Increased risk for developing diabetes: 5.7-6.4%   Diagnostic of diabetes: > or = 6.5%  .       Monitoring of Diabetes                Age (y)     Therapeutic Goal (%)   Adults:          >18           <7.0   Pediatrics:    13-18           <7.5                   7-12           <8.0                   0- 6            7.5-8.5   American Diabetes Association. Diabetes Care 33(S1), Jan 2010.       Albumin/Creatine Ratio   Date/Time Value Ref Range Status   11/02/2021 12:23 PM SEE COMMENT 0.0 - 30.0 ug/mg crt Final     Comment:     One or more analytes used in this calculation  "  is outside of the analytical measurement range.  Calculation cannot be performed.       Glucose tolerance test Fasting   Date/Time Value Ref Range Status   01/19/2023 08:28 AM 84 <126 mg/dL Final     Glucose tolerance test Two Hour   Date/Time Value Ref Range Status   01/19/2023 08:28  <200 mg/dL Final        Immunology Laboratory Tests     IgE   Date/Time Value Ref Range Status   01/06/2024 08:47 AM 9 0 - 214 IU/mL Final       DEXA Scan     No results found for this or any previous visit from the past 1000 days.       Chest Radiograph     XR chest 2 view 11/21/2022    Narrative  MRN: [id number]  Patient Name: OLIVIER VARGAS    STUDY:  TH CHEST 2 VIEW PA AND LAT;  11/21/2022 12:17 pm    INDICATION:  cp .    COMPARISON:  09/12/2021    ACCESSION NUMBER(S):  [id number]    ORDERING CLINICIAN:  NATACHA RODRIGUEZ    FINDINGS:  PA and lateral views of the chest were obtained.  No focal  infiltrate, pleural effusion or pneumothorax is identified. The  cardiac silhouette  is within normal limits for size.  Mild  discogenic degenerative changes are seen  throughout the thoracic  spine.    Impression  No focal infiltrate or pneumothorax.       CF Sputum Culture     No results found for: \"AFBCX\"   Respiratory Culture, Cystic Fibrosis   Date/Time Value Ref Range Status   01/03/2024 08:50 AM (3+) Moderate Normal throat kaushik  Final   01/03/2024 08:50 AM (A)  Final    (2+) Few Methicillin Susceptible Staphylococcus aureus (MSSA)       Susceptibility data from last 90 days.  Collected Specimen Info Organism Clindamycin Erythromycin Oxacillin Tetracycline Trimethoprim/Sulfamethoxazole Vancomycin   01/03/24 Fluid from SPUTUM Methicillin Susceptible Staphylococcus aureus (MSSA) R R S S S S     Normal throat kaushik                    Future Appointments   Date Time Provider Department Center   4/26/2024  9:50 AM Irma Mccormick MD [facility]   5/23/2024 10:15 AM Lana Osorio MD [id number]     Immunization History "   Administered Date(s) Administered    DTaP vaccine, pediatric  (INFANRIX) 1995, 1995, 1994, 08/16/1995, 04/12/1999    Flu vaccine (IIV4), preservative free *Check age/dose* 10/07/2015    HPV, Quadrivalent 03/05/2010, 08/03/2010, 10/31/2011    Hepatitis B vaccine, pediatric/adolescent (RECOMBIVAX, ENGERIX) 1994    HiB, unspecified 1994, 1994, 1994, 05/17/1995    Influenza, Unspecified 10/01/2018, 10/13/2020, 10/19/2021    Influenza, seasonal, injectable 10/23/2014, 10/06/2019    MMR vaccine, subcutaneous (MMR II) 05/17/1995, 04/12/1999    Meningococcal MCV4P 03/10/2008, 10/31/2011    Moderna SARS-CoV-2 Vaccination 01/08/2021, 02/03/2021, 11/06/2021    PPD Test 04/03/2012, 04/19/2012    Polio, Unspecified 1994, 1994, 1994, 04/12/1999    Poliovirus vaccine, subcutaneous (IPOL) 1994, 1994, 1994, 04/12/1999    Tdap vaccine, age 7 year and older (BOOSTRIX, ADACEL) 03/05/2010    Varicella vaccine, subcutaneous (VARIVAX) 08/16/1995, 03/10/2008       1/8/2024      Date of Review: 2/22/2024  Recommendations:   Tdap  Continue attempt to get GLP 1 agonist (Zepbound)  Yearly transplant discussion  Did she go back to EMDR?  No tobra in the future, use cayston if inhaled med needed    Nursing:   Typically grows:  MSSA  Annual labs due:  1/2025   CXR due:11/2024  Audiology: 9/2019 - normal - no longer on ototoxic meds   IV access:  no   [employer]: consented 2/24/2021  Transplant; yearly discussion:  needs at annual

## 2024-02-22 NOTE — PROGRESS NOTES
Nutrition      Nutritional Status BMI >30 (32.3)    Body Composition SHIRAZ: SLM 85.8 (fit), PBF 43.1%-Repeat due; HGS >1 SD below average for age     Body Image Pt. would like to lose wt. for health reasons     Supplements/TF None     Seen by GI Last seen 21; Not interested in F/U     GI Symptom Tracker Needs 24    Balanced Diet Yes     Calcium Intake Low-Adding oat and almond milk     Programs (Enzyme /WIC/ Do It In Person) Careforward     DEXA Ordered 2/2022; Osteopenia 19; Repeat due     OGTT IGT (Blood drawn at incorrect times)    Vitamin Levels WNL    Colonoscopy WNL; Repeat at 34 years old

## 2024-02-22 NOTE — PROGRESS NOTES
Psychosocial:   - Substance use: social ETOH, no other substance use  - Reproduction/Relationships:   - Insurance responsibilities: Private, [facility]  - Disease progression/Future planning: refer to medical team  - Advanced directives: Not completed, discussed and recommended  - Transplant and palliative care discussion: refer to medical team  Recommendations/summary: Venessa is a hardworking and outgoing 28yo woman. She has stable employment, income, insurance and housing. She lives with her  and reports no financial concerns. She reports symptoms of anxiety. She states she is on medication and sees her therapist regularly as well as a psych NP. Recommend completing advance directives.  JOMAR Malcolm

## 2024-02-22 NOTE — PROGRESS NOTES
Respiratory: 02/22/24  - Baseline FEV1: 110.8% 01-03-24 in lab RBC  - Airway clearance: aerobika, exercise - walks dog most days  - Hypertonic saline: No  - Pulmozyme: yes Once Day  - Inhaled antibiotics: No (discontinued tobramycin)  - Other inhaled meds: yes albuterol solution  - Oxygen: No  Recommendation/Summary: Daily exercise and/or airway clearance. Consider Cayston if needed in the future for pseudomonas. (patient reported ringing in right ear, diminished hearing)

## 2024-02-22 NOTE — PROGRESS NOTES
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 She has been on Trikafta since Nov 2019. Doing well with Trikafta. Takes 1 blue tab every AM and 2 orange tabs every PM (due to headaches, improved on this regimen)  Medication related labs: Last LFTs were Jan 2024, within normal limits. Due annually.  Drug-drug interactions were evaluated, no significant drug-drug interactions requiring therapy change identified.  Medication access: Meds [facility] obtained [facility]. Current insurance challenge obtaining Zepbound, appeal submitted.   Immunizations: Up-to-date on flu vaccine. Eligible for 23-24 COVID booster, confirm if received. No pneumococcal vaccine record identified, confirm if received. Last Tdap recorded as 3/5/10, due now if not received again.   Summary: Confirm if received bivalent COVID booster, pneumococcal vaccine, Tdap. Follow up on Zepbound appeal. Continue therapies.  Lynn Yanes, ZeinabD, BCPS, BCPPS

## 2024-03-10 DIAGNOSIS — Z30.09 BIRTH CONTROL COUNSELING: Primary | ICD-10-CM

## 2024-03-11 RX ORDER — DESOGESTREL AND ETHINYL ESTRADIOL 0.15-0.03
1 KIT ORAL
COMMUNITY
Start: 2020-05-27 | End: 2024-04-26 | Stop reason: WASHOUT

## 2024-03-11 RX ORDER — NEOMYCIN SULFATE, POLYMYXIN B SULFATE, HYDROCORTISONE 3.5; 10000; 1 MG/ML; [USP'U]/ML; MG/ML
SOLUTION/ DROPS AURICULAR (OTIC)
COMMUNITY
Start: 2023-10-30 | End: 2024-04-26 | Stop reason: WASHOUT

## 2024-03-11 RX ORDER — SYRING-NEEDL,DISP,INSUL,0.3 ML 29 G X1/2"
SYRINGE, EMPTY DISPOSABLE MISCELLANEOUS
COMMUNITY
Start: 2021-01-07 | End: 2024-04-26 | Stop reason: WASHOUT

## 2024-03-11 RX ORDER — HYOSCYAMINE SULFATE 0.125 MG
TABLET ORAL
COMMUNITY
Start: 2021-01-07 | End: 2024-04-26 | Stop reason: WASHOUT

## 2024-03-11 RX ORDER — NORETHINDRONE ACETATE AND ETHINYL ESTRADIOL 1MG-20(24)
1 KIT ORAL DAILY
Qty: 84 TABLET | Refills: 1 | Status: SHIPPED | OUTPATIENT
Start: 2024-03-11 | End: 2024-04-26 | Stop reason: SDUPTHER

## 2024-03-11 RX ORDER — SERTRALINE HYDROCHLORIDE 50 MG/1
TABLET, FILM COATED ORAL DAILY
COMMUNITY
Start: 2023-02-20 | End: 2024-04-26 | Stop reason: WASHOUT

## 2024-03-23 ENCOUNTER — APPOINTMENT (OUTPATIENT)
Dept: RADIOLOGY | Facility: HOSPITAL | Age: 29
End: 2024-03-23
Payer: COMMERCIAL

## 2024-03-23 ENCOUNTER — HOSPITAL ENCOUNTER (EMERGENCY)
Facility: HOSPITAL | Age: 29
Discharge: HOME | End: 2024-03-23
Payer: COMMERCIAL

## 2024-03-23 VITALS
TEMPERATURE: 99.3 F | RESPIRATION RATE: 18 BRPM | HEART RATE: 101 BPM | DIASTOLIC BLOOD PRESSURE: 98 MMHG | OXYGEN SATURATION: 98 % | SYSTOLIC BLOOD PRESSURE: 146 MMHG

## 2024-03-23 DIAGNOSIS — M79.606 PAIN OF LOWER EXTREMITY, UNSPECIFIED LATERALITY: Primary | ICD-10-CM

## 2024-03-23 PROCEDURE — 99284 EMERGENCY DEPT VISIT MOD MDM: CPT | Mod: 25

## 2024-03-23 PROCEDURE — 73562 X-RAY EXAM OF KNEE 3: CPT | Mod: LEFT SIDE | Performed by: RADIOLOGY

## 2024-03-23 PROCEDURE — 73562 X-RAY EXAM OF KNEE 3: CPT | Mod: LT

## 2024-03-23 PROCEDURE — 93971 EXTREMITY STUDY: CPT | Performed by: RADIOLOGY

## 2024-03-23 PROCEDURE — 93971 EXTREMITY STUDY: CPT

## 2024-03-23 RX ORDER — IBUPROFEN 600 MG/1
600 TABLET ORAL EVERY 6 HOURS
Qty: 20 TABLET | Refills: 0 | Status: SHIPPED | OUTPATIENT
Start: 2024-03-23 | End: 2024-05-01 | Stop reason: ALTCHOICE

## 2024-03-23 ASSESSMENT — COLUMBIA-SUICIDE SEVERITY RATING SCALE - C-SSRS
1. IN THE PAST MONTH, HAVE YOU WISHED YOU WERE DEAD OR WISHED YOU COULD GO TO SLEEP AND NOT WAKE UP?: NO
6. HAVE YOU EVER DONE ANYTHING, STARTED TO DO ANYTHING, OR PREPARED TO DO ANYTHING TO END YOUR LIFE?: NO
2. HAVE YOU ACTUALLY HAD ANY THOUGHTS OF KILLING YOURSELF?: NO

## 2024-03-23 ASSESSMENT — LIFESTYLE VARIABLES
TOTAL SCORE: 0
HAVE PEOPLE ANNOYED YOU BY CRITICIZING YOUR DRINKING: NO
EVER FELT BAD OR GUILTY ABOUT YOUR DRINKING: NO
HAVE YOU EVER FELT YOU SHOULD CUT DOWN ON YOUR DRINKING: NO
EVER HAD A DRINK FIRST THING IN THE MORNING TO STEADY YOUR NERVES TO GET RID OF A HANGOVER: NO

## 2024-03-23 ASSESSMENT — PAIN - FUNCTIONAL ASSESSMENT: PAIN_FUNCTIONAL_ASSESSMENT: 0-10

## 2024-03-23 ASSESSMENT — PAIN SCALES - GENERAL: PAINLEVEL_OUTOF10: 2

## 2024-03-23 NOTE — ED TRIAGE NOTES
Pt came in for left leg pain, pt states the pain started this morning when she woke up. Pt states there is some slight bruising to the back of her leg that she also noticed this morning. Pt denies any other symptoms. Pt is currently taking birthcontrol pills.
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
## 2024-03-26 PROCEDURE — RXMED WILLOW AMBULATORY MEDICATION CHARGE

## 2024-03-27 ENCOUNTER — SPECIALTY PHARMACY (OUTPATIENT)
Dept: PHARMACY | Facility: CLINIC | Age: 29
End: 2024-03-27

## 2024-03-27 PROCEDURE — RXMED WILLOW AMBULATORY MEDICATION CHARGE

## 2024-03-28 ENCOUNTER — SPECIALTY PHARMACY (OUTPATIENT)
Dept: PHARMACY | Facility: CLINIC | Age: 29
End: 2024-03-28

## 2024-03-28 ENCOUNTER — PHARMACY VISIT (OUTPATIENT)
Dept: PHARMACY | Facility: CLINIC | Age: 29
End: 2024-03-28
Payer: COMMERCIAL

## 2024-03-29 ENCOUNTER — TELEPHONE (OUTPATIENT)
Dept: PEDIATRIC PULMONOLOGY | Facility: HOSPITAL | Age: 29
End: 2024-03-29
Payer: COMMERCIAL

## 2024-03-29 NOTE — TELEPHONE ENCOUNTER
"Pt called. She specifically wanted to talk to Melina. She would only say that she went to the ED thinking that she had a blood clot but “there was no blood clot but they found something else\" Pt asked that Melina call her back on Monday when she returns to the office. Message sent to Melina and Dr. Marie.    "

## 2024-04-01 ENCOUNTER — TELEPHONE (OUTPATIENT)
Dept: PEDIATRIC PULMONOLOGY | Facility: HOSPITAL | Age: 29
End: 2024-04-01
Payer: COMMERCIAL

## 2024-04-01 NOTE — TELEPHONE ENCOUNTER
"RN spoke to pt:     Went to the ED last week for knee pain. No DVT found. Pt is concerned about the Lt knee findings (see US - copy of notation below). ED doctor told her it could be many things even a formation of cancer. She reached out to our office \"before she freaked out\" to see if it was CF related. Do you have any advise for Venessa?       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  "

## 2024-04-01 NOTE — TELEPHONE ENCOUNTER
"----- Message from Jim Nicholson MD sent at 4/1/2024  9:48 AM EDT -----  Regarding: RE: phone message  All,  Please let me know if I need to do something to help with Venessa's concerns.  ThanksJim  ----- Message -----  From: Xochitl Allen RN  Sent: 3/29/2024  10:44 AM EDT  To: Mary Marie MD PhD; #  Subject: phone message                                    Venessa called. She specifically wanted to talk to Melina. She would only tell me that she went to the ED thinking that she had a blood clot but \"there was no blood clot but they found something else\". She would like Melina to call her on Monday when she is back in the office.    Thanks,  G      "

## 2024-04-01 NOTE — TELEPHONE ENCOUNTER
RN called pt and provided Dr. Nicholson's recommendation. Pt verbalized understanding. Pt thankful for the call and expressed her relieve. RN advised if she felt the need she could discuss it further with Dr. Marie at her next apt. Pt verbalized understanding.

## 2024-04-12 ENCOUNTER — SPECIALTY PHARMACY (OUTPATIENT)
Dept: PHARMACY | Facility: CLINIC | Age: 29
End: 2024-04-12

## 2024-04-18 DIAGNOSIS — E84.9 CYSTIC FIBROSIS (MULTI): ICD-10-CM

## 2024-04-18 PROCEDURE — RXMED WILLOW AMBULATORY MEDICATION CHARGE

## 2024-04-18 RX ORDER — ELEXACAFTOR, TEZACAFTOR, AND IVACAFTOR 100-50-75
KIT ORAL
Qty: 84 EACH | Refills: 11 | Status: SHIPPED | OUTPATIENT
Start: 2024-04-18 | End: 2025-04-18

## 2024-04-24 ENCOUNTER — PHARMACY VISIT (OUTPATIENT)
Dept: PHARMACY | Facility: CLINIC | Age: 29
End: 2024-04-24
Payer: COMMERCIAL

## 2024-04-26 ENCOUNTER — OFFICE VISIT (OUTPATIENT)
Dept: OBSTETRICS AND GYNECOLOGY | Facility: CLINIC | Age: 29
End: 2024-04-26
Payer: COMMERCIAL

## 2024-04-26 VITALS
BODY MASS INDEX: 29.24 KG/M2 | SYSTOLIC BLOOD PRESSURE: 112 MMHG | HEIGHT: 64 IN | WEIGHT: 171.25 LBS | DIASTOLIC BLOOD PRESSURE: 72 MMHG

## 2024-04-26 DIAGNOSIS — Z30.09 BIRTH CONTROL COUNSELING: ICD-10-CM

## 2024-04-26 DIAGNOSIS — Z01.419 WELL WOMAN EXAM WITH ROUTINE GYNECOLOGICAL EXAM: ICD-10-CM

## 2024-04-26 DIAGNOSIS — N93.9 ABNORMAL UTERINE BLEEDING (AUB): ICD-10-CM

## 2024-04-26 LAB — PREGNANCY TEST URINE, POC: NEGATIVE

## 2024-04-26 PROCEDURE — 99395 PREV VISIT EST AGE 18-39: CPT | Performed by: OBSTETRICS & GYNECOLOGY

## 2024-04-26 PROCEDURE — 87624 HPV HI-RISK TYP POOLED RSLT: CPT

## 2024-04-26 PROCEDURE — 88175 CYTOPATH C/V AUTO FLUID REDO: CPT

## 2024-04-26 PROCEDURE — 81025 URINE PREGNANCY TEST: CPT | Performed by: OBSTETRICS & GYNECOLOGY

## 2024-04-26 PROCEDURE — 1036F TOBACCO NON-USER: CPT | Performed by: OBSTETRICS & GYNECOLOGY

## 2024-04-26 PROCEDURE — 88141 CYTOPATH C/V INTERPRET: CPT | Performed by: PATHOLOGY

## 2024-04-26 RX ORDER — NORETHINDRONE ACETATE AND ETHINYL ESTRADIOL 1MG-20(24)
1 KIT ORAL DAILY
Qty: 84 TABLET | Refills: 3 | Status: SHIPPED | OUTPATIENT
Start: 2024-04-26

## 2024-04-26 ASSESSMENT — PATIENT HEALTH QUESTIONNAIRE - PHQ9
1. LITTLE INTEREST OR PLEASURE IN DOING THINGS: NOT AT ALL
SUM OF ALL RESPONSES TO PHQ9 QUESTIONS 1 & 2: 0
2. FEELING DOWN, DEPRESSED OR HOPELESS: NOT AT ALL

## 2024-04-26 NOTE — PROGRESS NOTES
"Patient presents for an annual exam today   Last PAP 12/08/2021 NEG  Been bleeding for the past 3 weeks   On OCPs  Sexually active    Yahairayusra Verdugo, RMA    ANNUAL SUBJECTIVE    Venessa Duran is a 29 y.o. female who presents for annual exam today.  Her periods are usually regular, however she has been bleeding for 3 wks.  She is using COCs for contraception and is happy with this.      PMH - CF, depression, GERD    PSH - none    OB history - G0  The patient has never been pregnant.    Last pap -   Normal 2021    Last mammogram - never    Family history of breast or ovarian cancer - mom in her 50s, grandmother (unsure of age)    OBJECTIVE  /72 (BP Location: Left arm, Patient Position: Sitting, BP Cuff Size: Adult)   Ht 1.626 m (5' 4\")   Wt 77.7 kg (171 lb 4 oz)   LMP  (LMP Unknown) Comment: Bleeding  Breastfeeding No   BMI 29.39 kg/m²     General Appearance   - consistent with stated age, well groomed and cooperative    Integumentary  - skin warm and dry without rash    Head and Neck  - normalocephalic and neck supple    Chest and Lung Exam  - normal breathing effort, no respiratory distress    Breast  - symmetry noted, no mass palpable, no skin change and no nipple discharge.    Abdomen  - soft, nontender and no hepatomegaly, splenomegaly, or mass    Female Genitourinary  - vulva normal without rash or lesion, normal vaginal rugae, no vaginal discharge, uterus normal size & no palpable masses, no adnexal mass, no adnexal tenderness, no cervical motion tenderness    Peripheral Vascular  - no edema present    ASSESSMENT/PLAN  29 y.o. yo G0 female who presents for annual exam.       Actions performed during this visit include:  - Clinical breast exam normal  - Clinical pelvic exam normal  - Pap: done today  - Mammogram not indicated, mom was in her 50s, will confirm with her mom and see how old her grandmother was   - Contraception continue COCs, UPT neg for AUB, will watch bleeding for now, call if " continues to bleed once starts next pack    Please return for your next visit in 1 year.    Irma Mccormick MD

## 2024-04-30 NOTE — PROGRESS NOTES
Nurse Assessment:  5/1/2024    left message   *Tamiflu refill if needed    ANNUAL APPT: Today  5/1/2024   CR: 2/22/2024    Needs:   Annual labs:  done 1/9/2024  CXR:  due this year   Audiology:  no ototoxic medications  DEXA:      SOB:  denied   2.    O2:  n/a  3.    Mediport: n/a  4.    Flu vaccine: fall /2024  5.    COVID  Booster: declined booster   6.    Pt discussion/chief complaint/agenda:  Pt has lost 25 lbs being on Ozempic for 10 weeks. She found a program and pays out to pocket. She is feeling much better and the chest pain subsided.         RN Reviewed allergy list with pt. Pt stated that she did not have any issues with Tobramcyin and doesn't know why it is on her allergy list. Provider consider removing.

## 2024-05-01 ENCOUNTER — ALLIED HEALTH (OUTPATIENT)
Dept: PEDIATRIC PULMONOLOGY | Facility: HOSPITAL | Age: 29
End: 2024-05-01

## 2024-05-01 ENCOUNTER — NUTRITION (OUTPATIENT)
Dept: PEDIATRIC PULMONOLOGY | Facility: HOSPITAL | Age: 29
End: 2024-05-01
Payer: COMMERCIAL

## 2024-05-01 ENCOUNTER — MULTIDISCIPLINARY VISIT (OUTPATIENT)
Dept: PEDIATRIC PULMONOLOGY | Facility: HOSPITAL | Age: 29
End: 2024-05-01
Payer: COMMERCIAL

## 2024-05-01 ENCOUNTER — SPECIALTY PHARMACY (OUTPATIENT)
Dept: PHARMACY | Facility: CLINIC | Age: 29
End: 2024-05-01

## 2024-05-01 ENCOUNTER — DOCUMENTATION (OUTPATIENT)
Dept: PHYSICAL THERAPY | Facility: HOSPITAL | Age: 29
End: 2024-05-01

## 2024-05-01 ENCOUNTER — HOSPITAL ENCOUNTER (OUTPATIENT)
Dept: RESPIRATORY THERAPY | Facility: HOSPITAL | Age: 29
Discharge: HOME | End: 2024-05-01
Payer: COMMERCIAL

## 2024-05-01 ENCOUNTER — SOCIAL WORK (OUTPATIENT)
Dept: PEDIATRIC PULMONOLOGY | Facility: HOSPITAL | Age: 29
End: 2024-05-01
Payer: COMMERCIAL

## 2024-05-01 VITALS
SYSTOLIC BLOOD PRESSURE: 135 MMHG | HEIGHT: 64 IN | DIASTOLIC BLOOD PRESSURE: 91 MMHG | OXYGEN SATURATION: 97 % | HEART RATE: 109 BPM | BODY MASS INDEX: 28 KG/M2 | RESPIRATION RATE: 18 BRPM | WEIGHT: 164.02 LBS | TEMPERATURE: 98 F

## 2024-05-01 DIAGNOSIS — E84.0 CYSTIC FIBROSIS WITH PULMONARY MANIFESTATIONS (MULTI): ICD-10-CM

## 2024-05-01 DIAGNOSIS — E84.8 EXOCRINE PANCREATIC MANIFESTATION OF CYSTIC FIBROSIS (MULTI): ICD-10-CM

## 2024-05-01 DIAGNOSIS — K21.00 GASTROESOPHAGEAL REFLUX DISEASE WITH ESOPHAGITIS, UNSPECIFIED WHETHER HEMORRHAGE: ICD-10-CM

## 2024-05-01 DIAGNOSIS — E84.9 CF (CYSTIC FIBROSIS) (MULTI): ICD-10-CM

## 2024-05-01 DIAGNOSIS — K59.09 CHRONIC CONSTIPATION: ICD-10-CM

## 2024-05-01 DIAGNOSIS — E84.9 CYSTIC FIBROSIS (MULTI): ICD-10-CM

## 2024-05-01 DIAGNOSIS — H93.11 TINNITUS OF RIGHT EAR: Primary | ICD-10-CM

## 2024-05-01 LAB
FEF 25-75: 3.53 L/S
FEV1/FVC: 84 %
FEV1: 3.6 LITERS
FVC: 4.29 LITERS
PEF: 6.86 L/S

## 2024-05-01 PROCEDURE — 87186 SC STD MICRODIL/AGAR DIL: CPT | Performed by: PEDIATRICS

## 2024-05-01 PROCEDURE — 99214 OFFICE O/P EST MOD 30 MIN: CPT | Performed by: PEDIATRICS

## 2024-05-01 PROCEDURE — 94010 BREATHING CAPACITY TEST: CPT | Performed by: PEDIATRICS

## 2024-05-01 PROCEDURE — 94010 BREATHING CAPACITY TEST: CPT

## 2024-05-01 RX ORDER — ONDANSETRON 4 MG/1
4 TABLET, FILM COATED ORAL EVERY 8 HOURS PRN
Qty: 30 TABLET | Refills: 0 | Status: SHIPPED | OUTPATIENT
Start: 2024-05-01 | End: 2024-05-08

## 2024-05-01 RX ORDER — FAMOTIDINE 20 MG/1
20 TABLET, FILM COATED ORAL
Qty: 180 TABLET | Refills: 3 | Status: SHIPPED | OUTPATIENT
Start: 2024-05-01 | End: 2024-05-08

## 2024-05-01 ASSESSMENT — ANXIETY QUESTIONNAIRES
5. BEING SO RESTLESS THAT IT IS HARD TO SIT STILL: NEARLY EVERY DAY
6. BECOMING EASILY ANNOYED OR IRRITABLE: NOT AT ALL
1. FEELING NERVOUS, ANXIOUS, OR ON EDGE: SEVERAL DAYS
2. NOT BEING ABLE TO STOP OR CONTROL WORRYING: SEVERAL DAYS
4. TROUBLE RELAXING: MORE THAN HALF THE DAYS
3. WORRYING TOO MUCH ABOUT DIFFERENT THINGS: SEVERAL DAYS
GAD7 TOTAL SCORE: 8
7. FEELING AFRAID AS IF SOMETHING AWFUL MIGHT HAPPEN: NOT AT ALL

## 2024-05-01 ASSESSMENT — PATIENT HEALTH QUESTIONNAIRE - PHQ9
9. THOUGHTS THAT YOU WOULD BE BETTER OFF DEAD, OR OF HURTING YOURSELF: NOT AT ALL
6. FEELING BAD ABOUT YOURSELF - OR THAT YOU ARE A FAILURE OR HAVE LET YOURSELF OR YOUR FAMILY DOWN: NOT AT ALL
7. TROUBLE CONCENTRATING ON THINGS, SUCH AS READING THE NEWSPAPER OR WATCHING TELEVISION: SEVERAL DAYS
1. LITTLE INTEREST OR PLEASURE IN DOING THINGS: NOT AT ALL
3. TROUBLE FALLING OR STAYING ASLEEP OR SLEEPING TOO MUCH: MORE THAN HALF THE DAYS
5. POOR APPETITE OR OVEREATING: NOT AT ALL
2. FEELING DOWN, DEPRESSED OR HOPELESS: NOT AT ALL
4. FEELING TIRED OR HAVING LITTLE ENERGY: MORE THAN HALF THE DAYS
8. MOVING OR SPEAKING SO SLOWLY THAT OTHER PEOPLE COULD HAVE NOTICED. OR THE OPPOSITE, BEING SO FIGETY OR RESTLESS THAT YOU HAVE BEEN MOVING AROUND A LOT MORE THAN USUAL: NOT AT ALL

## 2024-05-01 NOTE — PROGRESS NOTES
[facility] SPECIALTY PHARMACY PATIENT REASSESSMENT NOTE    Venessa Duran is a 29 y.o. year-old female seen in clinic .    [facility] SUPPLIED MEDICATION:  Trikafta    Venessa Duran's care will be continued with the referring prescriber.     GENERAL ASSESSMENT:  Are there any changes to your home medications, OTCs or supplements? Changes as follows: Ozempic out of pocket, feels better    Current Outpatient Medications on File Prior to Visit   Medication Sig Dispense Refill    albuterol 2.5 mg /3 mL (0.083 %) nebulizer solution Inhale.      albuterol 90 mcg/actuation inhaler Inhale 2 puffs every 4 hours if needed.      buPROPion XL (Wellbutrin XL) 300 mg 24 hr tablet Take 1 tablet (300 mg) by mouth once daily in the morning. Take before meals.      cetirizine (ZyrTEC) 10 mg capsule Take by mouth.      Creon 24,000-76,000 -120,000 unit capsule TAKE 4-5 CAPSULES WITH MEALS AND SNACKS (3 MEALS PER DAY/ 2-3 SNACKS PER DAY) 1200 capsule 6    dornase Alpha (Pulmozyme) 1 mg/mL nebulizer solution Inhale one (1) vial via nebulizer once a day 225 mL 3    elexacaftor-tezacaftor-ivacaft (Trikafta) 100-50-75 mg tablet TAKE TWO (2) ORANGE TABLETS BY MOUTH IN THE MORNING AND ONE (1) BLUE TABLET BY MOUTH IN THE EVENING. TAKE 12 HOURS APART WITH FATTY FOODS. 84 each 11    famotidine (Pepcid) 20 mg tablet TAKE ONE (1) TABLET BY MOUTH EVERY 12 HOURS. 180 tablet 3    FLUoxetine (PROzac) 20 mg capsule TAKE 1 ORAL CAPSULE ONCE A DAY WITH 40MG (TOTAL 60MG)      FLUoxetine (PROzac) 40 mg capsule Take 1 capsule (40 mg) by mouth once daily.      norethindrone-e.estradioL-iron (Blisovi 24 Fe) 1 mg-20 mcg (24)/75 mg (4) tablet Take 1 tablet by mouth once daily. 84 tablet 3    ondansetron (Zofran) 4 mg tablet Take 1 tablet (4 mg) by mouth every 8 hours if needed for nausea or vomiting for up to 7 days. 30 tablet 0    [DISCONTINUED] Blisovi 24 Fe 1 mg-20 mcg (24)/75 mg (4) tablet TAKE 1 TABLET BY MOUTH EVERY DAY 84 tablet 1     [DISCONTINUED] desogestreL-ethinyl estradioL (Apri) 0.15-0.03 mg tablet Take 1 tablet by mouth once daily.      [DISCONTINUED] famotidine (Pepcid) 20 mg tablet TAKE ONE (1) TABLET BY MOUTH EVERY 12 HOURS. 180 tablet 3    [DISCONTINUED] fluticasone propionate 93 mcg/actuation aerosol breath activated Administer into affected nostril(s).      [DISCONTINUED] hyoscyamine (Anaspaz, Levsin) 0.125 mg tablet Take by mouth every 4 hours.      [DISCONTINUED] ibuprofen 600 mg tablet Take 1 tablet (600 mg) by mouth every 6 hours for 20 doses. 20 tablet 0    [DISCONTINUED] ketoconazole (NIZOral) 2 % shampoo APPLY DAILY TOPICALLY TO SCALP IN THE SHOWER      [DISCONTINUED] linaCLOtide (Linzess) 145 mcg capsule Take by mouth.      [DISCONTINUED] magnesium citrate solution Take by mouth.      [DISCONTINUED] neomycin-polymyxin-HC (Cortisporin) otic solution Hydrocortisone/neomycin/polymyxin B 1%-0.35%-10,000 units/mL otic solution      [DISCONTINUED] sertraline (Zoloft) 50 mg tablet Take by mouth once daily.       No current facility-administered medications on file prior to visit.       Do you have any new allergies? no new allergies reported    Allergies as of 05/01/2024 - Reviewed 05/01/2024   Allergen Reaction Noted    Bee pollen Runny nose 01/03/2024    Cat dander Runny nose 01/03/2024    Ciprofloxacin Nausea/vomiting 06/28/2017    House dust Runny nose 01/03/2024    Tobramycin Tinnitus and Dizziness 02/22/2024       Have you been diagnosed with any new medical conditions? no new reported medical conditions    Patient Active Problem List   Diagnosis    Chronic constipation    Chronic ethmoidal sinusitis    Cystic fibrosis (Multi)    Depression    Elevated serum GGT level    Exocrine pancreatic manifestation of cystic fibrosis (Multi)    JARED (generalized anxiety disorder)    Gastroesophageal reflux disease    Impaired glucose tolerance    Melanocytic nevi of trunk    Neoplasm of uncertain behavior of skin    Osteopenia    Pelvic  pain in female    Pseudomonas aeruginosa infection    Abnormal oral glucose tolerance test       CFTR Genotype: :[id number] / [id number]  Current CFTR Modulator: Trikafta  Dose: 2 orange in the night and 1 blue in the morning, helped with headaches, solved the headaches  Vertex GPS: Yes    TOLERANCE:   Have you experienced any side effects from this medication? Side effects reported include: brain fog, ear ringing for a couple of years - hearing test? Had a previous normal hearing screen, getting an MRI, weight gain, high blood pressure, worsening anxiety?    Are there any changes to current therapy regimen? No changes reported    EFFICACY:    Have you developed any new symptoms of your condition? No changes reported    How do you feel your medication is affecting your disease state? Trikafta still helping,     GOALS:  Your goals of therapy are: Improved quality of life, Improve/maintain lung function, and Reduce frequency of illness    COMPLIANCE / ADHERENCE:  Have you had any unplanned missed doses?No, missed less than 5 doses in 4 years  If yes, how often do you miss doses and is there a particular contributing reason? Coughs when misses a dose, harder to remember night dose, if traveling    What barriers to adherence does the patient have? (Answer Y/N for all):  Patient has a difficult time remembering to take medications? No  Difficult administration technique?No  Medication cost? No  Patient does not think medication is beneficial?No  Other?   What actions were taken to address barriers?    Does the patient have any barriers to self-administration (including physical and mental)? No  List barriers:   Describe actions taken to mitigate barriers:    Health Maintenance  Health Maintenance Due   Topic Date Due    HIV Screening  Never done    Derm Melanoma Skin Check  Never done    Hepatitis C Screening  Never done    DTaP/Tdap/Td Vaccines (7 - Td or Tdap) 03/05/2020    COVID-19 Vaccine (4 - 2023-24 season) 09/01/2023        Labs  Lab Results   Component Value Date    WBC 9.5 01/06/2024    HGB 13.7 01/06/2024    HCT 41.6 01/06/2024     01/06/2024    CHOL 151 01/19/2023    TRIG 155 (H) 01/19/2023    HDL 44.5 01/19/2023    ALT 16 01/06/2024    AST 15 01/06/2024     01/06/2024    K 4.6 01/06/2024     01/06/2024    CREATININE 0.70 01/06/2024    BUN 16 01/06/2024    CO2 25 01/06/2024    TSH 2.45 01/06/2024    INR 1.1 01/19/2023    HGBA1C 5.4 01/19/2023       Pulmonary Function Tests     FEV1   Date/Time Value Ref Range Status   05/01/2024 02:14 PM 3.60 liters      Comment:     112%          PATIENT MANAGEMENT:    Do you have any questions regarding your medications, or care? additional questions: Remove Creon from autofill    Do you have any concerns with access to your medications? Pharmacy logistical concern    How would you classify your Quality of Life? Doing well    Fills medications at:  Specialty    How would you describe your satisfaction with  Specialty Pharmacy? Issue with service - has a ton of pulmozyme, delivery was scheduled without her wanting it    Do you have any additional suggestions to improve  Specialty Pharmacy services: suggestion: Creon autofill, was able to get the pulmozyme issue fixed?    IMPRESSION/PLAN:  Is patient high risk (potential patients:  pregnancy, geriatric, pediatric)?  n/a  Is laboratory follow-up needed? Liver function tests due annually. Last done: Jan 2024  Is a clinical intervention needed? n/a    Additional comments:   -ozempic pays out of pocket, has lost 25 pounds, feels better  -Creon now tastes bad with new formulation  -remove Creon from autofill (done)    Lynn Yanes, PharmD   5/1/2024 3:50 PM

## 2024-05-01 NOTE — PROGRESS NOTES
"Cystic Fibrosis Nutrition Consult  Subjective    Nutrition Concerns/Questions: None    Exercise: Walking    Anthropometrics:  BMI      Estimated body mass index is 28.56 kg/m² as calculated from the following:    Height as of an earlier encounter on 5/1/24: 1.614 m (5' 3.54\").    Weight as of an earlier encounter on 5/1/24: 74.4 kg (164 lb 0.4 oz).     Highest wt? 85.2 kg  Pt goal for wt: Keep losing     Comment:  Intentionally lost 25# with Ozempic, would like to lose another 25# (Gained 50# on Trikafta)    Seeing GI? No      (last appt: 2021)    Additional Diagnosis  CFRD: No  IGT: Yes  Osteoporosis/penia: Osteopenia  Kidney Stones: Yes-Has received handout  CARLOS: Yes-once 5 years ago  Pancreatitis: No  Fracture: L arm, Gymnastics  Food allergies/intolerances: NKFA  Sinus affecting taste or smell: No  Colonoscopy: Repeat at 34 years old  Other/describe: None    Current/Past Feeding History:  Appetite: fair-Decreased with Ozempic-RD discussed increasing protein intake throughtout the day to maintain muscle during wt. loss    Diet Recall: Meals/day: 2 Snacks/Day: 0-1  B: Egg whites mixed with whole egg, fruit, coffee (almond milk)  L:   D: Meat (steak/chix/shrimp), starch (pasta, potato, risotto), vegetable  Snack: Yogurt; Fruit  Drinks: Coffee, Water, Diet Coke occasionally    Supplements: None  Calcium intake: Yogurt, cheese-Will start adding more yogurt  Sodium intake: Sometimes-RD discussed hydration in hotter months      CF Specific Vitamins:  DEKAs Essentials once daily   Other: None  Acid Blocker: Famotidine  Modulators (taking w fat/enzymes?): Trikafta-fat and enzymes  Other PERT medications / Appetite stimulants / insulin / ADHD / CAM:  Ozempic    Gastrointestinal History:  Enzyme: Brand Creon  Dose: w/meal 4-5     w/snacks: 2-3         Lipase units/kg/meal: 1612.9 Lipase units/kg/day: 7741.9  Enzyme Program: Careforward  Takes enzymes at: Beginning     Adjusting dose for size/fat content? Yes    GI / " Absorption (GI symptom tracker date: 5/1/24)  Sx:   nausea/stomachaches (Ozempic related)  # stools/d:  A couple times a week-Recommended Miralax daily (1 capful/day)    Description Formed, sink, don't stick to toilet    CFRD/Endocrine  OGTT Screening completed if no CFRD:  Yes  Lows? No   Reactive Hypoglycemia? No      Objective   Labs:         Fat soluble Vits Measured: WNL    Liver Enzymes: GPR 0.4, APRI 0.09  DEXA: Repeat due  OGTT: 84/161  Fasting Glu / HgbA1C: 84/5.4  Other: WNL    PF Readings from Last 1 Encounters:   No data found for PF       Vitamin D, 25-Hydroxy, Total   Date/Time Value Ref Range Status   01/06/2024 08:47 AM 35 31 - 100 ng/mL Final     Vitamin A (Retinol)   Date/Time Value Ref Range Status   01/06/2024 08:47 AM 0.61 0.30 - 1.20 mg/L Final     Vitamin A, Interpretation   Date/Time Value Ref Range Status   01/06/2024 08:47 AM Normal  Final     Comment:       This test was developed and its performance characteristics   determined by "[facility] [employer] [facility] [employer] [facility]". It has not been cleared or   approved by the [place] Food and Drug Administration. This test was   performed in a CLIA certified laboratory and is intended for   clinical purposes.  Performed By: "[facility]"  [address] [place] [address]  : Ryland Lee MD, PhD  CLIA Number: [id number]     Vitamin E (Alpha-Tocopherol)   Date/Time Value Ref Range Status   01/06/2024 08:47 AM 15.6 5.5 - 18.0 mg/L Final     Comment:       This test was developed and its performance characteristics   determined by "[employer] [employer] [facility] [employer] [facility]". It has not been cleared or   approved by the US Food and Drug Administration. This test was   performed in a CLIA certified laboratory and is intended for   clinical purposes.     Vitamin E (Gamma-Tocopherol)   Date/Time Value Ref Range Status   01/06/2024 08:47 AM 0.2 0.0 - 6.0 mg/L Final     Comment:     Performed By: "[facility]"  [address] [place] [address]  Laboratory  Director: Ryland Lee MD, PhD  CLIA Number: [id number]         AST   Date/Time Value Ref Range Status   01/06/2024 08:47 AM 15 5 - 40 U/L Final     ALT   Date/Time Value Ref Range Status   01/06/2024 08:47 AM 16 5 - 40 U/L Final     Alkaline Phosphatase   Date/Time Value Ref Range Status   01/06/2024 08:47 AM 69 35 - 125 U/L Final     Bilirubin, Total   Date/Time Value Ref Range Status   01/06/2024 08:47 AM 0.4 0.1 - 1.2 mg/dL Final     Bilirubin, Direct   Date/Time Value Ref Range Status   02/24/2021 01:13 PM 0.1 0.0 - 0.3 mg/dL Final     GGT   Date/Time Value Ref Range Status   01/06/2024 08:47 AM 10 5 - 55 U/L Final     Albumin   Date/Time Value Ref Range Status   01/06/2024 08:47 AM 4.1 3.5 - 5.0 g/dL Final     Total Protein   Date/Time Value Ref Range Status   01/06/2024 08:47 AM 6.7 5.9 - 7.9 g/dL Final       WBC   Date/Time Value Ref Range Status   01/06/2024 08:47 AM 9.5 4.4 - 11.3 x10*3/uL Final     Neutrophils Absolute   Date/Time Value Ref Range Status   01/06/2024 08:47 AM 5.45 1.20 - 7.70 x10*3/uL Final     Comment:     Percent differential counts (%) should be interpreted in the context of the absolute cell counts (cells/uL).     Neutrophils %   Date/Time Value Ref Range Status   01/06/2024 08:47 AM 57.2 40.0 - 80.0 % Final     Lymphocytes %   Date/Time Value Ref Range Status   01/06/2024 08:47 AM 33.6 13.0 - 44.0 % Final     Monocytes %   Date/Time Value Ref Range Status   01/06/2024 08:47 AM 6.6 2.0 - 10.0 % Final     Basophils %   Date/Time Value Ref Range Status   01/06/2024 08:47 AM 0.6 0.0 - 2.0 % Final     Eosinophils Absolute   Date/Time Value Ref Range Status   01/06/2024 08:47 AM 0.16 0.00 - 0.70 x10*3/uL Final     Eosinophils %   Date/Time Value Ref Range Status   01/06/2024 08:47 AM 1.7 0.0 - 6.0 % Final     Hemoglobin   Date/Time Value Ref Range Status   01/06/2024 08:47 AM 13.7 12.0 - 16.0 g/dL Final     Hematocrit   Date/Time Value Ref Range Status   01/06/2024 08:47 AM 41.6 36.0  - 46.0 % Final     RBC   Date/Time Value Ref Range Status   01/06/2024 08:47 AM 4.65 4.00 - 5.20 x10*6/uL Final     MCV   Date/Time Value Ref Range Status   01/06/2024 08:47 AM 90 80 - 100 fL Final     MCHC   Date/Time Value Ref Range Status   01/06/2024 08:47 AM 32.9 32.0 - 36.0 g/dL Final     Platelets   Date/Time Value Ref Range Status   01/06/2024 08:47  150 - 450 x10*3/uL Final       Sodium   Date/Time Value Ref Range Status   01/06/2024 08:47  133 - 145 mmol/L Final     Potassium   Date/Time Value Ref Range Status   01/06/2024 08:47 AM 4.6 3.4 - 5.1 mmol/L Final     Chloride   Date/Time Value Ref Range Status   01/06/2024 08:47  97 - 107 mmol/L Final     Bicarbonate   Date/Time Value Ref Range Status   01/06/2024 08:47 AM 25 24 - 31 mmol/L Final     Anion Gap   Date/Time Value Ref Range Status   01/06/2024 08:47 AM 12 <=19 mmol/L Final     Urea Nitrogen   Date/Time Value Ref Range Status   01/06/2024 08:47 AM 16 8 - 25 mg/dL Final     Creatinine   Date/Time Value Ref Range Status   01/06/2024 08:47 AM 0.70 0.40 - 1.60 mg/dL Final     GFR Female   Date/Time Value Ref Range Status   01/19/2023 08:28 AM >90 >90 mL/min/1.73m2 Final     Comment:      CALCULATIONS OF ESTIMATED GFR ARE PERFORMED   USING THE 2021 CKD-EPI STUDY REFIT EQUATION   WITHOUT THE RACE VARIABLE FOR THE IDMS-TRACEABLE   CREATININE METHODS.    https://jasn.asnjournals.org/content/early/2021/09/22/ASN.[email or web address]       Glucose   Date/Time Value Ref Range Status   01/06/2024 08:47 AM 84 65 - 99 mg/dL Final     Calcium   Date/Time Value Ref Range Status   01/06/2024 08:47 AM 9.3 8.5 - 10.4 mg/dL Final       Protime   Date/Time Value Ref Range Status   01/19/2023 08:28 AM 13.2 9.8 - 13.4 sec Final     INR   Date/Time Value Ref Range Status   01/19/2023 08:28 AM 1.1 0.9 - 1.1 Final       Hemoglobin A1C   Date/Time Value Ref Range Status   01/19/2023 08:28 AM 5.4 % Final     Comment:          Diagnosis of Diabetes-Adults    "Non-Diabetic: < or = 5.6%   Increased risk for developing diabetes: 5.7-6.4%   Diagnostic of diabetes: > or = 6.5%  .       Monitoring of Diabetes                Age (y)     Therapeutic Goal (%)   Adults:          >18           <7.0   Pediatrics:    13-18           <7.5                   7-12           <8.0                   0- 6            7.5-8.5   American Diabetes Association. Diabetes Care 33(S1), Jan 2010.       Albumin/Creatine Ratio   Date/Time Value Ref Range Status   11/02/2021 12:23 PM SEE COMMENT 0.0 - 30.0 ug/mg crt Final     Comment:     One or more analytes used in this calculation   is outside of the analytical measurement range.  Calculation cannot be performed.       Glucose tolerance test Fasting   Date/Time Value Ref Range Status   01/19/2023 08:28 AM 84 <126 mg/dL Final     Glucose tolerance test Two Hour   Date/Time Value Ref Range Status   01/19/2023 08:28  <200 mg/dL Final       Lab Results   Component Value Date    CHOL 151 01/19/2023    CHOL 149 11/09/2021    CHOL 159 10/23/2019     Lab Results   Component Value Date    HDL 44.5 01/19/2023    HDL 47.0 11/09/2021    HDL 46.8 10/23/2019     No results found for: \"LDLCALC\"  Lab Results   Component Value Date    TRIG 155 (H) 01/19/2023    TRIG 178 (H) 11/09/2021    TRIG 214 (H) 10/23/2019     No components found for: \"CHOLHDL\"    No results found for this or any previous visit from the past 1000 days.      Assessment/Plan       Assessment:  Nutritional Status Category: low    Goals:  Lose the 25#    Plan:  BMI >25  Pt. Is physically active  Pt. Has inadequate calcium intake  Pt. Has infrequent BMs  Pt. Has inadequate protein intake  Vitamins WNL    Adequate weight as evidenced by BMI greater than or equal to 22 (Cystic Fibrosis Foundation goal).    Recommendations:   Recommend maintain level of physical activity  Recommend add yogurt snack for calcium and protein intake  Recommend start daily Miralax (1 capful daily)  Recommend reach out " to RD as nutritional concerns arise

## 2024-05-01 NOTE — PROGRESS NOTES
Subjective   Patient ID: Venessa Duran is a 29 y.o. female who presents for Cystic Fibrosis in outpatient clinic.    Social Work Visit Type: This is an Annual Visit for Venessa Duran  Location: [facility]    Identifying Information                              : 1995  Assessment date: 2024    Objective     Patient accompanied by:  self    Family System / Parents:    Raised by:  with biological parent(s) (1 parent)  Mother:  Name:  Sarina  Father:  Name:    Siblings & Children Information:  Siblings: brothers: 1 and sisters: 1    Relationships / Social History:  Patient lives with: patient lives with , Freya, dog Baljeet -[place]  Relationship status: Relationship Status / Family Dynamic: :  Yes  How Long?  6 years dating,  1    Marital/relationship status:  stable monogamous marriage  Does patient have adequate housing?:Owns home  Does patient feel safe in home?: Yes  Supportive Relationships: spouse/partner, friends/neighbors, and family    Education:  Education: Highest Level of Education: Post-Graduate Degree [employer] within past year  Employer information: Place of employment , Occupation/How long , and Full-time  Spouse Education: Highest Level of Education: High School  Spouse Employer information: Full Time banker      Income / Insurance:  Total Household Income:  $90k+/yr  Insurance Options:  Private  employee      Disability  Are you on any form of disability? no    Adherence and Understanding of Health:  Knowledge of health:  good, Overall adherence:  fair, Adherence with medications:  fair, Managed by: patient, Understanding of medication:  good, and Adherence with appointments:   good    Cognition/Mood/Affect:  The patient was neatly groomed, appropriately dressed and adequately nourished.  What are the patient's psychosocial stressors:  work, family    Mood:   How has your mood been lately?: good  How do you manage stress?: not managing, walks  "dog  What are your goals for this year?: finish basement- just bought house, pay off credit card debt  What about having CF do you find challenging?  \"time commitment of meds and appts, how I feel is challenging sometimes\"    Thought Processes   Organized?  yes  Have you ever been hospitalized in a psychiatric hospital? no  Do you currently or have you ever seen a therapist/counselor/psychiatrist? yes  *If yes, indicate name/contact information: currently  Have you used medications for mental health issues, sleep and/or pain now or in the past?  yes  *If yes, please indicate names/dosages/prescribing provider information:  see chart    Substance Use:  Alcohol - occasionally    Advance Directives  Do you have an advance directive/living will?  no  has NO advanced directive  - add't info requested. Referral to SW: yes     Impression:  Patient open and agreeable to completing AA/MHS. Would like to complete HCPOA.   SW engaged in active listening and supportive counseling. SW facilitated the expression of thoughts and feeling related to the issues noted above. SW reviewed contact information and availability for on-going support in between CF clinic appointments.      SW participated in a CF clinic huddle during which time the patients care plan, treatment needs and issues were discussed. The information noted above has been shared with the CF team during CF clinic.     PHQ9: 5  GAD7: 8    Assessment/Plan     Plan: Completed HCPOA. Named  as primary and mom as secondary.   On-going psychosocial and emotional support, supportive counseling and referrals as indicated to maximize adjustment to living with cystic fibrosis.    JOMAR Burrell  May 1, 2024  "

## 2024-05-01 NOTE — PROGRESS NOTES
Alvina Pelletier M.D. [facility]    Plan from last visit:  Let me investigate Zepbound and  pharmacy/insurance  Let me decide how to deal with your chest pain  Return in 3 months  We'll call you by the end of the week             HPI    Your Annual Labs were labs were last done: 01/2024  Annual Labs are next due: 01/2025   Your last CXR was done: 11/2022  Your next CXR will be due: 11/2024  Your last DEXA scan was on: 11/2019  DEXA is next due: 2022 (now)  Your last audiology screening was: 9/2019      HPI  Interval history:     Patient started Ozempic since I saw her last  Has thrown up three times  Nausea the day after the shot  Otherwise doing well  Has lost 25 pounds and is excited about her weight loss  Plans to start running again when she loses a bit more  Feels a little too uncomfortable to run    Overall doing well.      Respiratory Review of Systems:  Cough: none  Sputum:  none  Hemoptysis: none  Chest Pain: none  Wheezing: none  Other Respiratory Symptoms:   Oxygen: none  Transplant eval: none  exercise: Walks daily    Airway Clearance:   none  If does aerosols, does albuterol and pulmozyme        GI Review of Systems:  Enzymes: on enzymes   Vitamins: on multivitamin and vitamin D   Supplements:  none  DUNIA:  none  Abdominal Pain: no recent abdominal pain  Stools:  normal, with ozempic goes to the bathroom less  Other:      ENDO: no issues    Birth control:  OCP     Currently on 10 mg of ozempic - dose until goal    Neuro:  No brain fog or memory loss    Pain: no pain anywhere    Blood pressure is dropping but not as much as she expected  140/ 100 now down to 130's/low 90  Discussed that this should improve with continued weight loss and increased exercise    Ringing in ears - staying about the same - only on the right  Discussed that I would like to order an MRI to rule out Acoustic Neuroma since it's unilateral  Explained that this is NOT CANCEROUS - but can occur (is benign) and  "would need followed  It could very well simply be from ototoxic meds that we've given in the past  Better to look and make sure    Current Medications     Current Outpatient Medications   Medication Instructions    albuterol 2.5 mg /3 mL (0.083 %) nebulizer solution inhalation    albuterol 90 mcg/actuation inhaler 2 puffs, inhalation, Every 4 hours PRN    buPROPion XL (WELLBUTRIN XL) 300 mg, oral, Daily before breakfast    cetirizine (ZyrTEC) 10 mg capsule oral    Creon 24,000-76,000 -120,000 unit capsule TAKE 4-5 CAPSULES WITH MEALS AND SNACKS (3 MEALS PER DAY/ 2-3 SNACKS PER DAY)    dornase Alpha (Pulmozyme) 1 mg/mL nebulizer solution Inhale one (1) vial via nebulizer once a day    elexacaftor-tezacaftor-ivacaft (Trikafta) 100-50-75 mg tablet TAKE TWO (2) ORANGE TABLETS BY MOUTH IN THE MORNING AND ONE (1) BLUE TABLET BY MOUTH IN THE EVENING. TAKE 12 HOURS APART WITH FATTY FOODS.    famotidine (Pepcid) 20 mg tablet TAKE ONE (1) TABLET BY MOUTH EVERY 12 HOURS.    FLUoxetine (PROzac) 20 mg capsule TAKE 1 ORAL CAPSULE ONCE A DAY WITH 40MG (TOTAL 60MG)    FLUoxetine (PROZAC) 40 mg, oral, Daily    norethindrone-e.estradioL-iron (Blisovi 24 Fe) 1 mg-20 mcg (24)/75 mg (4) tablet 1 tablet, oral, Daily    ondansetron (ZOFRAN) 4 mg, oral, Every 8 hours PRN       Physical Examination     BP (!) 135/91 (BP Location: Right arm, Patient Position: Sitting, BP Cuff Size: Adult)   Pulse 109   Temp 36.7 °C (98 °F) (Oral)   Resp 18   Ht 1.614 m (5' 3.54\")   Wt 74.4 kg (164 lb 0.4 oz)   LMP  (LMP Unknown) Comment: Bleeding  SpO2 97%   BMI 28.56 kg/m²     GENERAL APPEARANCE: Healthy appearing, in no acute distress  SKIN: no rashes  HEAD, EYES, EARS, NOSE, THROAT:  Conjunctiva and sclera clear. Tympanic membranes normal. No rhinitis, nasal mucosa normal without polyps. Oropharynx unremarkable  NECK: No lymphadenopathy  CHEST: AP Diameter normal. No retractions. Auscultation reveals good air exchange without crackles or wheeze. " "  HEART: Normal rhythm, without murmur  ABDOMEN: Not distended. Normal bowel sounds. Soft and non-tender to palpation, without hepatomegaly or splenomegaly. No masses  EXTREMITIES: Without cyanosis or edema.   LYMPHATIC: No lymphadenopathy  MUSCULOSKELETAL: Unremarkable   NEUROLOGIC: Grossly intact          CF Sputum Culture     No results found for: \"AFBCX\"   Respiratory Culture, Cystic Fibrosis   Date/Time Value Ref Range Status   01/03/2024 08:50 AM (3+) Moderate Normal throat kaushik  Final   01/03/2024 08:50 AM (A)  Final    (2+) Few Methicillin Susceptible Staphylococcus aureus (MSSA)       No results found for the last 90 days.        Problem List Items Addressed This Visit       Cystic fibrosis (Multi)    Overview     Primary Cystic Fibrosis Physician: Dr. Mary Marie   Primary Care Physician: Dr. Alexus Lawrence     Initial Diagnosis: 3/27/1995 sweat chloride, 103  Genotype: [id number] / [id number]    CFTR modulator: Trikafta  no longer taking Zithromax  high dose ibuprofen: no    Respiratory: 02/16/23  - Baseline FEV1: 109% November 2022  - Airway clearance: aerobika given in November 2022 (was running)  - Hypertonic saline: No  - Pulmozyme: yes Once Day  - Inhaled antibiotics: No (discontinued tobramycin)  - Other inhaled meds: yes albuterol solution  - Oxygen: No  Recommendation/Summary: Daily exercise and/or airway clearance. Consider [place] if needed in the future for pseudomonas.     History: pyelonephritis 2012  Hemoptysis: One episode likely due to sinuses   Pneumothorax: No  Sinus surgery: b/l endoscopic sinus surgery performed 3/19/15; septoplasty/inferior turbinate outfracture performed  CARLOS:   DVT: No  Fertility Issues: On birth control, has normal periods with this. July 2019 long discussion in regards to fertility and having children in the future when she's ready. Would like to talk to genetic counseling;     Procedures & Oral/IV course:  - has never required admission with IV abx for " lungs  - admission inpatient 1/29-1/31/20 due to influenza B --> IVF, no IV abx  - oral abx for exacerbation: Levaquin (does not tolerate Cipro because it makes her sick)         Relevant Medications    famotidine (Pepcid) 20 mg tablet    ondansetron (Zofran) 4 mg tablet    Other Relevant Orders    XR chest 2 views    Gastroesophageal reflux disease    Relevant Medications    famotidine (Pepcid) 20 mg tablet    ondansetron (Zofran) 4 mg tablet        # Cystic fibrosis without exacerbation  - grows PSA intermittently, as well as haemophilus and staph     ______________________________________________________________________________________  sick plan:  - can do doxy or Levaquin with good results  ______________________________________________________________________________________     #chest pain - stress test in 2024 negative    # weight gain  - taking compounded ozempic and has lost 25 pounds    # anxiety   - started seeing a psych NP  - had increased anxiety on the Wellbutrin  - now on Lexapro and doing well     #. exocrine pancreatic insufficiency  - enzymes     # Right lower abdominal pain  - better     #. Osteopenia  - due for a DEXA scan     # high blood pressure without the diagnosis of HTN  - is improving slowly with weight loss       # ringing in right ear, diminished hearing  - start nasal steroid, no improvement  - will get MRI to rule out acoustic neuroma     Plan for this visit:  Return in 3 months  Keep up the good work  We'll call you to run with us next year if you're interested  Glad the ozempic is working out for you.  Needs genetic testing for   Get MRI done to rule out acoustic neuroma  7.   Melina to discuss with her how to get genetic testing done for her .  Can they meet with Waleska Syed to discuss?      Follow-up:  Visit date not found     Mary Marie MD PhD   05/01/2024

## 2024-05-01 NOTE — PATIENT INSTRUCTIONS
"    It was very nice to see you today. We can offer you in-person and \"virtual\" appointments with your cystic fibrosis provider.    If you need to cancel or schedule an appointment, please call the  at the [facility] at [phone] between the hours of 8 AM and 5 PM, Monday-Friday.    To reach the CF nurse, Melina, please call [phone]. Melina has a secure voice mail account if you want to leave a message.    If you need to speak with an adult cystic fibrosis provider between the hours of 5 PM and 8 AM (overnight) or anytime on Saturday or Sunday, please call [phone]. When you call this number, you will be connected to an  with the [facility] and [facility] answering service. You should tell the  that you're an adult with cystic fibrosis who needs to speak to the \"pediatric pulmonary doctor on-call.\" The  will take your contact information. You should then expect a call back from the cystic fibrosis doctor on-call within a short period of time.        Follow up:  Pulmonology:  GI:  ENDO:  Other:  Plan for this visit:  Return in 3 months  Keep up the good work  We'll call you to run with us next year if you're interested  Glad the ozempic is working out for you.  Needs genetic testing for           Next appointment: Visit date not found    "

## 2024-05-01 NOTE — Clinical Note
Plan for this visit: 1. Return in 3 months 2. Keep up the good work 3. We'll call you to run with us next year if you're interested 4. Glad the ozempic is working out for you. 5. Needs genetic testing for  6. Get MRI done to rule out acoustic neuroma 7.   Melina to discuss with her how to get genetic testing done for her .  Can they meet with Waleska Syed to discuss?

## 2024-05-03 DIAGNOSIS — E84.0 CYSTIC FIBROSIS WITH PULMONARY MANIFESTATIONS (MULTI): Primary | ICD-10-CM

## 2024-05-03 NOTE — PROGRESS NOTES
Respiratory Note:  Patient's Airway Clearance: Vibralung (does not use)  Frequency: N/A  Settings: N/A  Exercise: Walking the dog daily x 30 min  Oscillating Device: Aerobika (not using)  [place] Cough: Reviewed and practiced today  Primary: Exercise  Secondary: Lake Cough    Aerosols: Name of medication, frequency, type of nebulizer used, Mask or Mouthpiece?  Bronchodilators: Albuterol Neb once daily  Pulmozyme: Once Daily (not consistent)  Hypertonic Saline: No  Antibiotics: No (Can not take Alexis due to ringing in right ear)  ICS/Combinations: No    Hearing Test: Needs to schedule an apt & hearing test with ENT  Spacer: Yes  Compressor: [employer] Vios Pro  Home PFT Device: PREMA [employer]  Frequency: Not using  Technique: Good    Method of Cleaning Neb Cups: soap and water then   Method of Cleaning PFT Device: soap and sterile, rinse with sterile water, soak in 3% Hydrogen peroxide x 30 min, air dry completely.    Pharmacy for respiratory meds: [facility] and  Special[facility]  Patient Assistance Program:   Oxygen: No  Home Care Company:  LPM:  Continuous, nocturnal, PRN, intermittent w/exacerbation:   CPAP, BIPAP, Assisted Breathing (use at home or in-patient): No  Have you smoked cigarettes in the last year?: No  Are you exposed to second hand smoke or electronic cigarette vapor?: No  Does anyone in your household smoke cigarettes?: No  Did this patient use electronic cigarettes (vape) this year? No  During the reporting year, how often was this patient vaping? Not at all    Teaching: Venessa and I discussed scheduling a hearing test because of the ringing in her right ear. She agreed to schedule a visit.  Venessa needs new Zayda LC plus neb cups. I entered an e-script to  Specialty.

## 2024-05-04 LAB
BACTERIA SPT CF RESP CULT: ABNORMAL
BACTERIA SPT CF RESP CULT: ABNORMAL

## 2024-05-05 RX ORDER — PEN NEEDLE, DIABETIC 31 GX5/16"
NEEDLE, DISPOSABLE MISCELLANEOUS
Qty: 1 EACH | Refills: 11 | Status: SHIPPED | OUTPATIENT
Start: 2024-05-05

## 2024-05-06 ENCOUNTER — APPOINTMENT (OUTPATIENT)
Dept: DERMATOLOGY | Facility: HOSPITAL | Age: 29
End: 2024-05-06
Payer: COMMERCIAL

## 2024-05-07 ENCOUNTER — SPECIALTY PHARMACY (OUTPATIENT)
Dept: PHARMACY | Facility: CLINIC | Age: 29
End: 2024-05-07

## 2024-05-07 LAB
CYTOLOGY CMNT CVX/VAG CYTO-IMP: NORMAL
HPV HR 12 DNA GENITAL QL NAA+PROBE: NEGATIVE
HPV HR GENOTYPES PNL CVX NAA+PROBE: NEGATIVE
HPV16 DNA SPEC QL NAA+PROBE: NEGATIVE
HPV18 DNA SPEC QL NAA+PROBE: NEGATIVE
LAB AP HPV GENOTYPE QUESTION: YES
LAB AP HPV HR: NORMAL
LABORATORY COMMENT REPORT: NORMAL
PATH REPORT.TOTAL CANCER: NORMAL

## 2024-05-08 ENCOUNTER — TELEPHONE (OUTPATIENT)
Dept: PEDIATRIC PULMONOLOGY | Facility: HOSPITAL | Age: 29
End: 2024-05-08
Payer: COMMERCIAL

## 2024-05-08 DIAGNOSIS — E84.9 CYSTIC FIBROSIS (MULTI): Primary | ICD-10-CM

## 2024-05-08 RX ORDER — FAMOTIDINE 20 MG/1
20 TABLET, FILM COATED ORAL 2 TIMES DAILY
COMMUNITY
Start: 2024-05-08

## 2024-05-08 RX ORDER — FAMOTIDINE 20 MG/1
20 TABLET, FILM COATED ORAL 2 TIMES DAILY
Qty: 60 TABLET | Refills: 11 | Status: CANCELLED | OUTPATIENT
Start: 2024-05-08 | End: 2025-05-08

## 2024-05-08 NOTE — TELEPHONE ENCOUNTER
RD called pt. To discuss famotidine. RD explained that insurance won't cover famotidine. Pt. Is okay getting medication OTC. Pt. Reports that she takes in chronically. RD explained that famotidine can become less effective over time, but pt. feels that it is effective. Pt. Reports that she notices when she misses a dose. Pt. Had no further concerns at this time. RD provided contact info and encouraged pt. To reach out as nutritional concerns arise. Pt. Was agreeable.

## 2024-05-09 PROCEDURE — RXMED WILLOW AMBULATORY MEDICATION CHARGE

## 2024-05-13 PROCEDURE — RXMED WILLOW AMBULATORY MEDICATION CHARGE

## 2024-05-16 ENCOUNTER — PHARMACY VISIT (OUTPATIENT)
Dept: PHARMACY | Facility: CLINIC | Age: 29
End: 2024-05-16
Payer: COMMERCIAL

## 2024-05-23 ENCOUNTER — APPOINTMENT (OUTPATIENT)
Dept: DERMATOLOGY | Facility: CLINIC | Age: 29
End: 2024-05-23
Payer: COMMERCIAL

## 2024-05-27 LAB
FEF 25-75: 4.28 L/S
FEV1/FVC: NORMAL %
FEV1: 3.58 LITERS
FVC: 4.28 LITERS
PEF: 7.34 L/S

## 2024-06-05 ENCOUNTER — SPECIALTY PHARMACY (OUTPATIENT)
Dept: PHARMACY | Facility: CLINIC | Age: 29
End: 2024-06-05

## 2024-06-10 NOTE — PROGRESS NOTES
PT Evaluation: CF Clinic    Patient Name:Venessa Duran  MRN:[id number]  Today's Date: 5/1/2024       Venessa was seen today for a PT evaluation, as part of the multidisciplinary CF clinic visit. Venessa has good lung function (FEV1>100%), and is not limited by SOB or endurance at baseline. Venessa has been actively losing weight intentionally, and has planned another 25lb weight loss to get back to her baseline. Venessa scored in the 25th percentile for the 1 min STS test, compared to normative data for age/sex matched values. Venessa was experiencing joint pain at her last visit, but no longer is an issue. PT will plan to follow up with her annually, or sooner if indicated.     Pain  0/10    Social History  Recently  and has a new house. 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     Respiratory  What do you do for Airway clearance?  Does not perform     Modulator Therapy: Trikafta                                          Exercise: current program  Walks the dog daily (30 min/day). Used to run and exercise more, but has not had time recently. Working on losing weight intentionally. Has lost 25 lbs, wanting to lose about 25 more.   Home equipment: free weights (8# and 12# DB), and exercise bike     FEV1 today: 112%   Baseline FEV1: 111%      Urinary Incontinence/Pelvic Floor  -Do you experience dribbling, with or without cough?  No    -The Knack: counter bracing technique. It involves benedicto the pelvic floor muscles before and during any incr in downward pressure on the pelvic floor    Vestibular:  No issues    Musculoskeletal    Posture: WFL    Standing lateral side bend: equal on both sides     Standing trunk rotation, flexion, extension : equal and symmetrical         MMT:   R LE : 5/5 throughout  L LE : 5/5 throughout    Exercise Tests/Outcome Measures:     Vitals pre exercise: , spO2 98% at rest     1)Sit to Stand  60 sec STS :   38x   bpm, spO2 98%  on room air  RPD 2/10, RPE 13/20  *25th percentile for age/sex matched norms

## 2024-06-14 ENCOUNTER — APPOINTMENT (OUTPATIENT)
Dept: RADIOLOGY | Facility: CLINIC | Age: 29
End: 2024-06-14
Payer: COMMERCIAL

## 2024-06-14 PROCEDURE — RXMED WILLOW AMBULATORY MEDICATION CHARGE

## 2024-06-15 ENCOUNTER — PHARMACY VISIT (OUTPATIENT)
Dept: PHARMACY | Facility: CLINIC | Age: 29
End: 2024-06-15
Payer: COMMERCIAL

## 2024-06-28 ENCOUNTER — APPOINTMENT (OUTPATIENT)
Dept: RADIOLOGY | Facility: CLINIC | Age: 29
End: 2024-06-28
Payer: COMMERCIAL

## 2024-07-09 ENCOUNTER — ALLIED HEALTH (OUTPATIENT)
Dept: PEDIATRIC PULMONOLOGY | Facility: HOSPITAL | Age: 29
End: 2024-07-09
Payer: COMMERCIAL

## 2024-07-09 NOTE — PROGRESS NOTES
Test  Result  Flag  Reference    -----EDUCATION (see below)      Result:  Transplant yearly discussion 3/2/2022    Mediport no      -----NURSING 2/16/2023      Annual labs 1/19/2023      Annual Visit/ Case Review (see below)      Result:  2/22/2023; CR 2/16/23- ISO completed 2/1/2023    Chest XRay (see below)      Result:  11/2022 - mild discogenic changes throught thoracis spine    Audiology (see below)      Result:  9/2029 - normal - no longer on ototoxic medications    -----NUTRITION EN 2-23 RD Annual Assessment Done 23      Nutritional Status BMI >30      Body Composition (see below)      Result:  SHIRAZ: SLM 85.8 (fit), PBF 43.1%-Repeat due; HGS >1 SD below average for age    Body Image (see below)      Result:  Pt. would like to lose wt. for health reasons    Supplements/TF None      Seen by GI (see below)      Result:  Last seen 21; Not interested in F/U    GI Symptom Tracker (see below)      Result:  Often gas/bloating, passes large stools, strong smelling stools; Sometimes oily light-colored, loose stools, constipation    Balanced Diet Yes      Calcium Intake Low-Adding oat and almond milk      Programs (Enzyme /WIC/ Healthwell) Careforward      Pancreatic Enzymes Creon      DEXA (see below)      Result:  Ordered 2/2022; Osteopenia 19; Repeat due    OGTT WNL      Seen by Endo No      Vitamin Levels WNL      Colonoscopy WNL; Repeat at 34 years old      Transplant/Pregnancy Nutrition Needs N/A      -----PULMONARY 02-20-23      RT Annual Assessment 02-22-23 Completed      Lung Disease Severity 109% Nov 2022      Airway clearance aerobika, exercise      Pulmozyme yes QD      Hypertonic Saline no      Inhaled Antibiotic no (alfred in the past)      Other Inhaled Med yes albuterol solution      Oxygen/non-invasive support no      Home Spirometer yes but has not used in a while      -----PHARMACY 2/21/23 MJ      Azithromycin d/c 2015      Chronic Oral Antibiotics None      CFTR Modulator Trikafta      Ibuprofen  None      Transplant and Pall Care Discussion (see below)      Result:  Discussed 2/1/2022 - pt anxious about the topic    -----RESEARCH PSQI 2/1/2023      Biobank Consented 2/24/2021            Findings History:            23Jan2023 28Jan2022 [id number]   Item Name 1 1 1 1 1   Adherence    Very attentive to her health care needs    Advance Directives    Not completed, have discussed    Airway clearance aerobika, exercise Exercise, Running, Vibralung exercise-running (vibralung) VibraLung Vibralung   Annual labs 1/19/2023 11/21/2022 6/16/21 - pt will go to local  lab; 6/2020 1/29/2020 10/2019   Balanced Diet Yes Yes Yes Yes eating healthier   Body Composition SHIRAZ: SLM 85.8 (fit), PBF 43.1%-Repeat due; HGS >1 SD below average for age HGS >1 SD below average for age; SHIRAZ: SLM Normal, PBF Over (43.1) TBD TBD    Body Image Pt. would like to lose wt. for health reasons Pt. okay w/ wt., wants to get stronger Pt. has no concerns at this time Slightly concerned about wt. gain    Calcium Intake Low-Adding oat and almond milk Adequate Adequate Low dairy intake-Discussed 3 servings of dairy daily encouraged, jaycee considering history of kidney stones   CFTR Modulator Trikafta Trikafta Triakafta Trikafta, CMP 2/19/20 Trikafta 11/2019   Colonoscopy WNL; Repeat at 34 years old WNL; Repeat at 34 years old WNL 21; Repeat at 34 Not age appropriate seen by peds GI in past for suspected liver dsease   DEXA Ordered 2/2022; Osteopenia 19; Repeat due Ordered given to pt 11/16/22 MN; Osteopenia 19; Repeat 22 Osteopenia 2020; Repeat 22 Osteopenia 2019; Repeat 2022 needs baseline- printed order 11/2019   EDUCATION Transplant yearly discussion 3/2/2022 Transplant ED:3/2/22 pt accepting of the transplant discussion. pt very anxious about the topic - brought up but declined discussion 2021  Annual scheduled 5/27/20, CR 5/22/20    Flu vaccine  10/28/2022 @ ; 10/19/2021 10/19/2021 10/13/2020 10/2019   GI Symptom Tracker  Often gas/bloating, passes large stools, strong smelling stools; Sometimes oily light-colored, loose stools, constipation Often strong-smelling stools; Sometimes gas/bloating, nausea, constipation, large stools Gas, Bloating, Constipation, stomach aches, oily stools; RD recommended increasing enzymes Frequent gas needs   High Risk Behaviors    None    Hypertonic Saline no No no No No   Ibuprofen None Not on high-dose ibuprofen      Infection Control  Updated on COVID Vaccine 5 COVID vac Moderna 1/8/20201; 2nd dose 2/3/2021; booster 11/6/2021     Insurance Responsibilities    Private insurance & [facility]    Lung Disease Severity 109% Nov 2022 Early early Early Early   Mediport no no      Mental Health Screen    Completed 5/27/2020 7/17/19   Met with     Sleep quality index 2/19/20    NURSING 2/16/2023 [place] 11/15/2022 6/16/21 Joining Patient/ Family [place] LFT's due 1/2021 CXR 10/2019; Audio 9/2019   NUTRITION EN 2-23 11-22 ts EN 11-21 EN  7-15-19 TS   Nutritional status BMI >30 BMI >25 BMI >25 BMI >25 Acceptable   OGTT WNL Needs 22 Needs 21 WNL IGT 2018 (156). pending 2019   Pancreatic Enzymes Creon Creon Creon Creon Creon   PHARMACY 2/21/23 [facility] 03/02/2022 [facility]/19/20 [facility]   Programs (Enzyme /WIC/ [facility]well) Careforward Careforward Provided info for Careforward None Creon program   PULMONARY 02-20-23 06.13.22 PA 11/13/21 11/18/2020 PA 9.19.19 PA   Pulmozyme yes QD Q day yes QD Yes QD Yes Q day   RD Annual Assessment Done 23 Done 22 Done 21 Done 2020 done 2019 but still need to review annual labs   Reproduction/Relationships    Discussed with MD/has significant other    RESEARCH PSQI 2/1/2023 PSQI: 2/16/22      RT Annual Assessment 02-22-23 Completed 03.02.22 Completed 02/24/21 Completed completed 05/27/2020 1.16.19 Completed   Solo Visit    N/A    Substance Use    None Social EOTH   Supplements/TF None None None None none   SW Assessment    5/27/2020 7/17/19   Transplant and  Pall Care Discussion Discussed 2/1/2022 - pt anxious about the topic   Not at this time    Transplant/Pregnancy Nutrition Needs N/A N/A N/A N/A    Vitamin Levels WNL Needs 22 (WNL 21) WNL Elevated Vitamin A-reduced DEKAs; Still needs PT/INR 2020 labs wnl 2018; pending 2019   Seen by GI Last seen 21; Not interested in F/U Last seen 2/24/21; Not interested in F/U at this time Last seen 2/24/21; F/U due No    Seen by Endo No No No No    Inhaled Antibiotic no (alfred in the past) No (Tobramycin D/C) no (tobramycin discontinued) Tobramycin discontinued    Other Inhaled Med yes albuterol solution Albuterol Albuterol Albuterol Solution, ProAir MDI    Biobank Consented 2/24/2021 Consented 2/24/21 Consent 2/24/2021 1st 2/24/2021     Referrals  psych [facility]     Annual Visit/ Case Review 2/22/2023; CR 2/16/23- ISO completed 2/1/2023 3/2/22; CR: 2/17/22 Inside out sent 2/8/22 2/24/2021; CR 2/18/2021; Completed inside out 2/2021     Chest XRay 11/2022 - mild discogenic changes throught thoracis spine 11/21/2022; 9/2021 1/2020 1/29/2020    Audiology 9/2029 - normal - no longer on ototoxic medications 9/2019 - normal hearing eval 9/2019 - normal     Azithromycin d/c 2015 Not on azithromycin 500mg M-W-F     Chronic Oral Antibiotics None Not on chronic oral antibiotics      Oxygen/non-invasive support no No no no    Home Spirometer yes but has not used in a while PREMA MetaFLO (Need monthly tests...last test 11/2020) yes last used Nov 2020 No    Referral to Pulmonary Rehab  No  NO

## 2024-07-10 PROCEDURE — RXMED WILLOW AMBULATORY MEDICATION CHARGE

## 2024-07-15 ENCOUNTER — PHARMACY VISIT (OUTPATIENT)
Dept: PHARMACY | Facility: CLINIC | Age: 29
End: 2024-07-15
Payer: COMMERCIAL

## 2024-08-01 PROCEDURE — RXMED WILLOW AMBULATORY MEDICATION CHARGE

## 2024-08-02 ENCOUNTER — PHARMACY VISIT (OUTPATIENT)
Dept: PHARMACY | Facility: CLINIC | Age: 29
End: 2024-08-02
Payer: COMMERCIAL

## 2024-08-05 PROCEDURE — RXMED WILLOW AMBULATORY MEDICATION CHARGE

## 2024-08-06 ENCOUNTER — PHARMACY VISIT (OUTPATIENT)
Dept: PHARMACY | Facility: CLINIC | Age: 29
End: 2024-08-06
Payer: COMMERCIAL

## 2024-08-14 ENCOUNTER — TELEPHONE (OUTPATIENT)
Dept: PEDIATRIC PULMONOLOGY | Facility: HOSPITAL | Age: 29
End: 2024-08-14
Payer: COMMERCIAL

## 2024-08-14 DIAGNOSIS — U07.1 COVID: ICD-10-CM

## 2024-08-14 DIAGNOSIS — E84.9 CYSTIC FIBROSIS (MULTI): ICD-10-CM

## 2024-08-14 NOTE — TELEPHONE ENCOUNTER
Pt called to report she tested positive for COVID. Sx: started yesterday with a fever 101.0 intermittent, increased cough from baseline, Lt ear pain. Denies ST, HA.  Pt is taking Dayquil. RN advised fluids, rest and manage fever with Tylenol/IBP.  Pt was asking if there is a prescription to take. RN discussed Paxlovid and the need to adjust Trikafta and the risk for COVID rebound. Advised pt RN will notify Dr. Marie for recommendations. zia

## 2024-08-14 NOTE — TELEPHONE ENCOUNTER
Per Dr. Marie:   I'm happy to provide her with paxlovid if she is interested. I think she will be fine taking it. Please talk to her about Trikafta adjusgments and pend the script. I'll sign. Thank you!!     RN called pt with the plan and discussed Trikafta dose adjustment. RN sent dose adjustment via [facility] as well. [facility]

## 2024-08-15 RX ORDER — NIRMATRELVIR AND RITONAVIR 300-100 MG
3 KIT ORAL 2 TIMES DAILY
Qty: 30 TABLET | Refills: 0 | Status: SHIPPED | OUTPATIENT
Start: 2024-08-15 | End: 2024-08-20

## 2024-09-03 PROCEDURE — RXMED WILLOW AMBULATORY MEDICATION CHARGE

## 2024-09-04 ENCOUNTER — PHARMACY VISIT (OUTPATIENT)
Dept: PHARMACY | Facility: CLINIC | Age: 29
End: 2024-09-04
Payer: COMMERCIAL

## 2024-09-25 ENCOUNTER — TRANSCRIBE ORDERS (OUTPATIENT)
Dept: RESPIRATORY THERAPY | Facility: HOSPITAL | Age: 29
End: 2024-09-25
Payer: COMMERCIAL

## 2024-09-25 DIAGNOSIS — E84.9 CF (CYSTIC FIBROSIS) (MULTI): Primary | ICD-10-CM

## 2024-09-30 PROCEDURE — RXMED WILLOW AMBULATORY MEDICATION CHARGE

## 2024-10-01 ENCOUNTER — PHARMACY VISIT (OUTPATIENT)
Dept: PHARMACY | Facility: CLINIC | Age: 29
End: 2024-10-01
Payer: COMMERCIAL

## 2024-10-24 ENCOUNTER — PHARMACY VISIT (OUTPATIENT)
Dept: PHARMACY | Facility: CLINIC | Age: 29
End: 2024-10-24
Payer: COMMERCIAL

## 2024-10-24 PROCEDURE — RXMED WILLOW AMBULATORY MEDICATION CHARGE

## 2024-10-25 ENCOUNTER — PHARMACY VISIT (OUTPATIENT)
Dept: PHARMACY | Facility: CLINIC | Age: 29
End: 2024-10-25
Payer: COMMERCIAL

## 2024-10-29 ENCOUNTER — MULTIDISCIPLINARY MEETING (OUTPATIENT)
Dept: PEDIATRIC PULMONOLOGY | Facility: HOSPITAL | Age: 29
End: 2024-10-29
Payer: COMMERCIAL

## 2024-10-31 NOTE — PROGRESS NOTES
Nurse Assessment:    Pt needs set up with Waleska Syed     ANNUAL APPT: 5/1/2024  CR: 2/22/2024    Needs: n/a  Annual labs:   1/9/2024  CXR: 11/21/2022  Audiology:  9/2019 normal - no ototoxic meds  DEXA: 11/6/2019  Flu vaccine: 10/31/2024  COVID  Booster: declined     *Tamiflu refill if needed -       SOB:  denied   2.    O2:  n/a  3.    Mediport: n/a  4.    Pt discussion/chief complaint/agenda:  Check up - no concerns   5. Culture swab obtained

## 2024-11-06 ENCOUNTER — HOSPITAL ENCOUNTER (OUTPATIENT)
Dept: RESPIRATORY THERAPY | Facility: HOSPITAL | Age: 29
Discharge: HOME | End: 2024-11-06
Payer: COMMERCIAL

## 2024-11-06 ENCOUNTER — MULTIDISCIPLINARY VISIT (OUTPATIENT)
Dept: PEDIATRIC PULMONOLOGY | Facility: HOSPITAL | Age: 29
End: 2024-11-06
Payer: COMMERCIAL

## 2024-11-06 VITALS
BODY MASS INDEX: 29.88 KG/M2 | HEART RATE: 110 BPM | WEIGHT: 175.04 LBS | HEIGHT: 64 IN | RESPIRATION RATE: 17 BRPM | DIASTOLIC BLOOD PRESSURE: 90 MMHG | SYSTOLIC BLOOD PRESSURE: 130 MMHG | OXYGEN SATURATION: 98 % | TEMPERATURE: 99.1 F

## 2024-11-06 DIAGNOSIS — F32.A DEPRESSION, UNSPECIFIED DEPRESSION TYPE: ICD-10-CM

## 2024-11-06 DIAGNOSIS — E84.9 CF (CYSTIC FIBROSIS) (MULTI): ICD-10-CM

## 2024-11-06 DIAGNOSIS — A49.8 PSEUDOMONAS AERUGINOSA INFECTION: ICD-10-CM

## 2024-11-06 DIAGNOSIS — E84.8 EXOCRINE PANCREATIC MANIFESTATION OF CYSTIC FIBROSIS (MULTI): ICD-10-CM

## 2024-11-06 DIAGNOSIS — E84.9 CYSTIC FIBROSIS: Primary | ICD-10-CM

## 2024-11-06 DIAGNOSIS — F41.1 GAD (GENERALIZED ANXIETY DISORDER): ICD-10-CM

## 2024-11-06 LAB
MGC ASCENT PFT - FEV1 - PRE: 3.45
MGC ASCENT PFT - FEV1 - PREDICTED: 3.04
MGC ASCENT PFT - FVC - PRE: 4.27
MGC ASCENT PFT - FVC - PREDICTED: 3.56

## 2024-11-06 PROCEDURE — 87077 CULTURE AEROBIC IDENTIFY: CPT | Performed by: PEDIATRICS

## 2024-11-06 PROCEDURE — 99214 OFFICE O/P EST MOD 30 MIN: CPT | Performed by: PEDIATRICS

## 2024-11-06 PROCEDURE — 94010 BREATHING CAPACITY TEST: CPT

## 2024-11-06 PROCEDURE — 87070 CULTURE OTHR SPECIMN AEROBIC: CPT | Performed by: PEDIATRICS

## 2024-11-06 RX ORDER — OSELTAMIVIR PHOSPHATE 75 MG/1
75 CAPSULE ORAL EVERY 12 HOURS
Qty: 10 CAPSULE | Refills: 0 | Status: SHIPPED | OUTPATIENT
Start: 2024-11-06 | End: 2024-11-11

## 2024-11-06 RX ORDER — PANCRELIPASE 15000; 3000; 9500 [USP'U]/1; [USP'U]/1; [USP'U]/1
CAPSULE, DELAYED RELEASE ORAL
COMMUNITY

## 2024-11-06 ASSESSMENT — PAIN SCALES - GENERAL: PAINLEVEL_OUTOF10: 0-NO PAIN

## 2024-11-06 NOTE — PATIENT INSTRUCTIONS
"    It was very nice to see you today. We can offer you in-person and \"virtual\" appointments with your cystic fibrosis provider.    If you need to cancel or schedule an appointment, please call the  at the [facility] at [phone] between the hours of 8 AM and 5 PM, Monday-Friday.    To reach the CF nurse, Melina, please call [phone]. Melina has a secure voice mail account if you want to leave a message.    If you need to speak with an adult cystic fibrosis provider between the hours of 5 PM and 8 AM (overnight) or anytime on Saturday or Sunday, please call [phone]. When you call this number, you will be connected to an  with the [facility] answering service. You should tell the  that you're an adult with cystic fibrosis who needs to speak to the \"pediatric pulmonary doctor on-call.\" The  will take your contact information. You should then expect a call back from the cystic fibrosis doctor on-call within a short period of time.        Follow up:  Pulmonology:  GI:  ENDO:  Other:  Plan for this visit:   Return in 3-6 months or as needed  Keep up the great work!  Let me know BEFORE you decide to try for a child  Think about removing some of the stress from your life          Next appointment: Visit date not found    "

## 2024-11-06 NOTE — PROGRESS NOTES
Alvina Pelletier M.D. [facility]    Plan from last visit:  Return in 3 months  Keep up the good work  We'll call you to run with us next year if you're interested  Glad the ozempic is working out for you. - had to stop taking it.  Needs genetic testing for  - has Waleska Syed's contact info  Get MRI done to rule out acoustic neuroma - did not do this, ringing is gone  Melina to discuss with her how to get genetic testing done for her .  Can they meet with Waleska Syed to discuss?       HPI    Your Annual Labs were labs were last done: 01/2024  Annual Labs are next due: 01/2025   Your last CXR was done: 11/2022  Your next CXR will be due: 11/2024  Your last DEXA scan was on: 11/2019  DEXA is next due: 2022 (now)  Your last audiology screening was: 9/2019      HPI  Interval history:     Stopped the ozempic due to the level of stess she was experiencing  Wants to get back on it when she can  Lost 25 pounds and gained 10 of it back  Job is very stressful, working 30 extra hours  a week  Discussed that this level of stress if not good for her  Is thinking of making changes    Symptoms went away of the ringing in her ear  Did not get MRI done        Respiratory Review of Systems:  Cough: none  Sputum:  none  Hemoptysis: none  Chest Pain: none  Wheezing: none  Other Respiratory Symptoms:   Oxygen: none  Transplant eval: none  exercise: Walks daily    Airway Clearance:   none  If does aerosols, does albuterol and pulmozyme        GI Review of Systems:  Enzymes: on enzymes   Vitamins: on multivitamin and vitamin D   Supplements:  none  DUNIA:  none  Abdominal Pain: no recent abdominal pain  Stools:  normal, with ozempic goes to the bathroom less  Other:      ENDO: no issues    Birth control:  OCP      Neuro:  No brain fog or memory loss    Pain: no pain anywhere    Blood pressure is same this time as last time    Psych - gave her a prescription for something for anxiety as needed  Taking it  "every other day or so  Still seeing a counselor, not doing EMDR  Current Medications     Current Outpatient Medications   Medication Instructions    albuterol 2.5 mg /3 mL (0.083 %) nebulizer solution Inhale.    albuterol 90 mcg/actuation inhaler 2 puffs, Every 4 hours PRN    buPROPion XL (WELLBUTRIN XL) 300 mg, Daily before breakfast    cetirizine (ZyrTEC) 10 mg capsule Take by mouth.    dornase Alpha (Pulmozyme) 1 mg/mL nebulizer solution Inhale one (1) vial via nebulizer once a day    elexacaftor-tezacaftor-ivacaft (Trikafta) 100-50-75 mg tablet TAKE TWO (2) ORANGE TABLETS BY MOUTH IN THE MORNING AND ONE (1) BLUE TABLET BY MOUTH IN THE EVENING. TAKE 12 HOURS APART WITH FATTY FOODS.    famotidine (PEPCID) 20 mg, 2 times daily    FLUoxetine (PROzac) 20 mg capsule TAKE 1 ORAL CAPSULE ONCE A DAY WITH 40MG (TOTAL 60MG)    FLUoxetine (PROZAC) 40 mg, Daily    lipase-protease-amylase (Creon) 3,000-9,500- 15,000 unit capsule     multivitamin with minerals-folic acid-vitamin K-CoQ (DEKAs Plus) 200 mcg-1,000 mcg-10 mg capsule 1 capsule, Daily    nebulizers (LC Plus Nebulizer-Ped Mask) misc Please dispense one Quench LC plus Nebulizer cup with mouthpiece and tubing with each refill of Pulmozyme.    norethindrone-e.estradioL-iron (Blisovi 24 Fe) 1 mg-20 mcg (24)/75 mg (4) tablet 1 tablet, oral, Daily    oseltamivir (TAMIFLU) 75 mg, oral, Every 12 hours       Physical Examination     /90 (BP Location: Left arm, Patient Position: Sitting, BP Cuff Size: Adult)   Pulse 110   Temp 37.3 °C (99.1 °F) (Oral)   Resp 17   Ht 1.614 m (5' 3.54\")   Wt 79.4 kg (175 lb 0.7 oz)   SpO2 98%   BMI 30.48 kg/m²     GENERAL APPEARANCE: Healthy appearing, in no acute distress  SKIN: no rashes  HEAD, EYES, EARS, NOSE, THROAT:  Conjunctiva and sclera clear. Tympanic membranes normal. No rhinitis, nasal mucosa normal without polyps. Oropharynx unremarkable  NECK: No lymphadenopathy  CHEST: AP Diameter normal. No retractions. Auscultation " "reveals good air exchange without crackles or wheeze.   HEART: Normal rhythm, without murmur  ABDOMEN: Not distended. Normal bowel sounds. Soft and non-tender to palpation, without hepatomegaly or splenomegaly. No masses  EXTREMITIES: Without cyanosis or edema.   LYMPHATIC: No lymphadenopathy  MUSCULOSKELETAL: Unremarkable   NEUROLOGIC: Grossly intact        CF Sputum Culture     No results found for: \"AFBCX\"   Respiratory Culture, Cystic Fibrosis   Date/Time Value Ref Range Status   11/06/2024 01:24 PM (4+) Abundant Normal throat kaushik  Final   11/06/2024 01:24 PM (1+) Rare Pseudomonas aeruginosa mucoid (A)  Final       Spirometry:  11/06/2024  FVC 4.27 119%/ FEV1 3.45 113%/ 0.81 94%        Problem List Items Addressed This Visit       Cystic fibrosis - Primary    Overview     Primary Cystic Fibrosis Physician: Dr. Mary Marie   Primary Care Physician: Dr. Alexus Lawrence     Initial Diagnosis: 3/27/1995 sweat chloride, 103  Genotype: [id number] / [id number]    CFTR modulator: Trikafta  no longer taking Zithromax  high dose ibuprofen: no    Respiratory: 02/22/24  - Baseline FEV1: 110.8% 01-03-24 in lab RBC  - Airway clearance: aerobika, exercise - walks dog most days  - Hypertonic saline: No  - Pulmozyme: yes Once Day  - Inhaled antibiotics: No (discontinued tobramycin)  - Other inhaled meds: yes albuterol solution  - Oxygen: No  Recommendation/Summary: Daily exercise and/or airway clearance. Consider [place] if needed in the future for pseudomonas. (patient reported ringing in right ear, diminished hearing)      History: pyelonephritis 2012  Hemoptysis: One episode likely due to sinuses   Pneumothorax: No  Sinus surgery: b/l endoscopic sinus surgery performed 3/19/15; septoplasty/inferior turbinate outfracture performed  CARLOS:   DVT: No  Fertility Issues: On birth control, has normal periods with this. July 2019 long discussion in regards to fertility and having children in the future when she's ready. Would like " to talk to genetic counseling;     Procedures & Oral/IV course:  - has never required admission with IV abx for lungs  - admission inpatient 1/29-1/31/20 due to influenza B --> IVF, no IV abx  - oral abx for exacerbation: Levaquin (does not tolerate Cipro because it makes her sick)         Relevant Medications    oseltamivir (Tamiflu) 75 mg capsule    Other Relevant Orders    Respiratory Culture, Cystic Fibrosis (Completed)    Fungal Culture/Smear (Completed)    XR DEXA bone density    Depression    Exocrine pancreatic manifestation of cystic fibrosis (Multi)    JARED (generalized anxiety disorder)    Pseudomonas aeruginosa infection          # Cystic fibrosis without exacerbation  - grows PSA intermittently, as well as haemophilus and staph     ______________________________________________________________________________________  sick plan:  - can do doxy or Levaquin with good results  ______________________________________________________________________________________     #chest pain - stress test in 2024 negative  - no longer happening    # weight gain  - taking compounded ozempic and has lost 25 pounds  - had to stop ozempic        # anxiety   - started seeing a psych NP  - still not controlled     #. exocrine pancreatic insufficiency  - enzymes     # Right lower abdominal pain  - better     #. Osteopenia  - due for a DEXA scan     # high blood pressure without the diagnosis of HTN  - is improving slowly with weight loss    # family history of breast cancer   - start mammograms at 34  - discussed MRI screening      # derm  - gets skin cancer checks yearly         Plan for this visit:   Return in 3-6 months or as needed  Keep up the great work!  Let me know BEFORE you decide to try for a child  Think about removing some of the stress from your life        Follow-up:  Visit date not found     Mary Marie MD PhD   11/06/2024

## 2024-11-08 LAB
FUNGUS SPEC CULT: ABNORMAL
FUNGUS SPEC FUNGUS STN: ABNORMAL

## 2024-11-09 LAB
BACTERIA SPT CF RESP CULT: ABNORMAL
BACTERIA SPT CF RESP CULT: ABNORMAL

## 2024-11-11 ENCOUNTER — HOSPITAL ENCOUNTER (OUTPATIENT)
Dept: RADIOLOGY | Facility: CLINIC | Age: 29
Discharge: HOME | End: 2024-11-11
Payer: COMMERCIAL

## 2024-11-11 DIAGNOSIS — E84.9 CYSTIC FIBROSIS: ICD-10-CM

## 2024-11-11 PROCEDURE — 77080 DXA BONE DENSITY AXIAL: CPT | Performed by: RADIOLOGY

## 2024-11-11 PROCEDURE — 77080 DXA BONE DENSITY AXIAL: CPT

## 2024-11-20 PROCEDURE — RXMED WILLOW AMBULATORY MEDICATION CHARGE

## 2024-11-21 ENCOUNTER — PHARMACY VISIT (OUTPATIENT)
Dept: PHARMACY | Facility: CLINIC | Age: 29
End: 2024-11-21
Payer: COMMERCIAL

## 2024-12-17 PROCEDURE — RXMED WILLOW AMBULATORY MEDICATION CHARGE

## 2024-12-19 ENCOUNTER — PHARMACY VISIT (OUTPATIENT)
Dept: PHARMACY | Facility: CLINIC | Age: 29
End: 2024-12-19
Payer: COMMERCIAL

## 2025-01-09 PROCEDURE — RXMED WILLOW AMBULATORY MEDICATION CHARGE

## 2025-01-13 ENCOUNTER — PHARMACY VISIT (OUTPATIENT)
Dept: PHARMACY | Facility: CLINIC | Age: 30
End: 2025-01-13
Payer: COMMERCIAL

## 2025-01-16 PROCEDURE — RXMED WILLOW AMBULATORY MEDICATION CHARGE

## 2025-01-17 ENCOUNTER — PHARMACY VISIT (OUTPATIENT)
Dept: PHARMACY | Facility: CLINIC | Age: 30
End: 2025-01-17
Payer: COMMERCIAL

## 2025-02-06 PROCEDURE — RXMED WILLOW AMBULATORY MEDICATION CHARGE

## 2025-02-08 ENCOUNTER — PHARMACY VISIT (OUTPATIENT)
Dept: PHARMACY | Facility: CLINIC | Age: 30
End: 2025-02-08
Payer: COMMERCIAL

## 2025-02-18 DIAGNOSIS — E84.9 CYSTIC FIBROSIS: ICD-10-CM

## 2025-02-18 DIAGNOSIS — Z30.09 BIRTH CONTROL COUNSELING: ICD-10-CM

## 2025-02-18 RX ORDER — NORETHINDRONE ACETATE AND ETHINYL ESTRADIOL 1MG-20(24)
1 KIT ORAL DAILY
Qty: 84 TABLET | Refills: 3 | Status: SHIPPED | OUTPATIENT
Start: 2025-02-18

## 2025-02-18 RX ORDER — ELEXACAFTOR, TEZACAFTOR, AND IVACAFTOR 100-50-75
KIT ORAL
Qty: 84 EACH | Refills: 11 | Status: SHIPPED | OUTPATIENT
Start: 2025-02-18 | End: 2026-02-18

## 2025-03-06 PROCEDURE — RXMED WILLOW AMBULATORY MEDICATION CHARGE

## 2025-03-07 ENCOUNTER — PHARMACY VISIT (OUTPATIENT)
Dept: PHARMACY | Facility: CLINIC | Age: 30
End: 2025-03-07
Payer: COMMERCIAL

## 2025-03-31 PROCEDURE — RXMED WILLOW AMBULATORY MEDICATION CHARGE

## 2025-04-02 ENCOUNTER — EDUCATION (OUTPATIENT)
Dept: PEDIATRIC PULMONOLOGY | Facility: HOSPITAL | Age: 30
End: 2025-04-02

## 2025-04-02 ENCOUNTER — PHARMACY VISIT (OUTPATIENT)
Dept: PHARMACY | Facility: CLINIC | Age: 30
End: 2025-04-02
Payer: COMMERCIAL

## 2025-04-02 ENCOUNTER — MULTIDISCIPLINARY VISIT (OUTPATIENT)
Dept: PEDIATRIC PULMONOLOGY | Facility: HOSPITAL | Age: 30
End: 2025-04-02
Payer: COMMERCIAL

## 2025-04-02 ENCOUNTER — SOCIAL WORK (OUTPATIENT)
Dept: PEDIATRIC PULMONOLOGY | Facility: HOSPITAL | Age: 30
End: 2025-04-02
Payer: COMMERCIAL

## 2025-04-02 ENCOUNTER — ALLIED HEALTH (OUTPATIENT)
Dept: PEDIATRIC PULMONOLOGY | Facility: HOSPITAL | Age: 30
End: 2025-04-02
Payer: COMMERCIAL

## 2025-04-02 ENCOUNTER — HOSPITAL ENCOUNTER (OUTPATIENT)
Dept: RESPIRATORY THERAPY | Facility: HOSPITAL | Age: 30
Discharge: HOME | End: 2025-04-02
Payer: COMMERCIAL

## 2025-04-02 VITALS
WEIGHT: 174.49 LBS | TEMPERATURE: 98.8 F | OXYGEN SATURATION: 98 % | HEIGHT: 64 IN | SYSTOLIC BLOOD PRESSURE: 136 MMHG | DIASTOLIC BLOOD PRESSURE: 93 MMHG | BODY MASS INDEX: 29.79 KG/M2 | HEART RATE: 98 BPM | RESPIRATION RATE: 20 BRPM

## 2025-04-02 DIAGNOSIS — R41.89 BRAIN FOG: ICD-10-CM

## 2025-04-02 DIAGNOSIS — E84.9 CYSTIC FIBROSIS: Primary | ICD-10-CM

## 2025-04-02 DIAGNOSIS — E84.0 CYSTIC FIBROSIS WITH PULMONARY MANIFESTATIONS (MULTI): ICD-10-CM

## 2025-04-02 DIAGNOSIS — K59.09 CHRONIC CONSTIPATION: ICD-10-CM

## 2025-04-02 DIAGNOSIS — I10 HYPERTENSION, UNSPECIFIED TYPE: ICD-10-CM

## 2025-04-02 DIAGNOSIS — E84.8 EXOCRINE PANCREATIC MANIFESTATION OF CYSTIC FIBROSIS (MULTI): ICD-10-CM

## 2025-04-02 DIAGNOSIS — F41.1 GAD (GENERALIZED ANXIETY DISORDER): ICD-10-CM

## 2025-04-02 DIAGNOSIS — A49.8 PSEUDOMONAS AERUGINOSA INFECTION: ICD-10-CM

## 2025-04-02 DIAGNOSIS — R73.02 IMPAIRED GLUCOSE TOLERANCE: ICD-10-CM

## 2025-04-02 DIAGNOSIS — E84.9 CYSTIC FIBROSIS: ICD-10-CM

## 2025-04-02 PROCEDURE — 87070 CULTURE OTHR SPECIMN AEROBIC: CPT | Performed by: PEDIATRICS

## 2025-04-02 PROCEDURE — 87077 CULTURE AEROBIC IDENTIFY: CPT | Performed by: PEDIATRICS

## 2025-04-02 PROCEDURE — 99215 OFFICE O/P EST HI 40 MIN: CPT | Performed by: PEDIATRICS

## 2025-04-02 PROCEDURE — 94010 BREATHING CAPACITY TEST: CPT | Performed by: PEDIATRICS

## 2025-04-02 PROCEDURE — 94010 BREATHING CAPACITY TEST: CPT

## 2025-04-02 PROCEDURE — 99215 OFFICE O/P EST HI 40 MIN: CPT | Mod: 25 | Performed by: PEDIATRICS

## 2025-04-02 RX ORDER — OSELTAMIVIR PHOSPHATE 75 MG/1
75 CAPSULE ORAL EVERY 12 HOURS
Qty: 10 CAPSULE | Refills: 0 | Status: SHIPPED | OUTPATIENT
Start: 2025-04-02 | End: 2025-04-07

## 2025-04-02 ASSESSMENT — ANXIETY QUESTIONNAIRES
2. NOT BEING ABLE TO STOP OR CONTROL WORRYING: NEARLY EVERY DAY
7. FEELING AFRAID AS IF SOMETHING AWFUL MIGHT HAPPEN: NOT AT ALL
GAD7 TOTAL SCORE: 15
1. FEELING NERVOUS, ANXIOUS, OR ON EDGE: NEARLY EVERY DAY
5. BEING SO RESTLESS THAT IT IS HARD TO SIT STILL: NEARLY EVERY DAY
6. BECOMING EASILY ANNOYED OR IRRITABLE: NOT AT ALL
3. WORRYING TOO MUCH ABOUT DIFFERENT THINGS: NEARLY EVERY DAY
4. TROUBLE RELAXING: NEARLY EVERY DAY

## 2025-04-02 ASSESSMENT — PATIENT HEALTH QUESTIONNAIRE - PHQ9
7. TROUBLE CONCENTRATING ON THINGS, SUCH AS READING THE NEWSPAPER OR WATCHING TELEVISION: NEARLY EVERY DAY
3. TROUBLE FALLING OR STAYING ASLEEP OR SLEEPING TOO MUCH: MORE THAN HALF THE DAYS
9. THOUGHTS THAT YOU WOULD BE BETTER OFF DEAD, OR OF HURTING YOURSELF: NOT AT ALL
2. FEELING DOWN, DEPRESSED OR HOPELESS: NOT AT ALL
6. FEELING BAD ABOUT YOURSELF - OR THAT YOU ARE A FAILURE OR HAVE LET YOURSELF OR YOUR FAMILY DOWN: NOT AT ALL
1. LITTLE INTEREST OR PLEASURE IN DOING THINGS: NOT AT ALL
4. FEELING TIRED OR HAVING LITTLE ENERGY: NEARLY EVERY DAY
8. MOVING OR SPEAKING SO SLOWLY THAT OTHER PEOPLE COULD HAVE NOTICED. OR THE OPPOSITE, BEING SO FIGETY OR RESTLESS THAT YOU HAVE BEEN MOVING AROUND A LOT MORE THAN USUAL: NEARLY EVERY DAY
5. POOR APPETITE OR OVEREATING: NOT AT ALL

## 2025-04-02 ASSESSMENT — PAIN SCALES - GENERAL: PAINLEVEL_OUTOF10: 0-NO PAIN

## 2025-04-02 NOTE — PROGRESS NOTES
[facility] SPECIALTY PHARMACY PATIENT REASSESSMENT NOTE:  CYSTIC FIBROSIS    Venessa Duran is a 30 y.o. female seen in clinic .    [facility] Supplied Medication::  Jesus Duran's care will be continued with the referring prescriber.     GENERAL ASSESSMENT:  Are there any changes to your home medications, OTCs or supplements? No changes reported    Current Outpatient Medications on File Prior to Visit   Medication Sig Dispense Refill    albuterol 2.5 mg /3 mL (0.083 %) nebulizer solution Inhale. (Patient not taking: Reported on 4/2/2025)      albuterol 90 mcg/actuation inhaler Inhale 2 puffs every 4 hours if needed. (Patient not taking: Reported on 4/2/2025)      buPROPion XL (Wellbutrin XL) 300 mg 24 hr tablet Take 1 tablet (300 mg) by mouth once daily in the morning. Take before meals.      cetirizine (ZyrTEC) 10 mg capsule Take by mouth.      dornase Alpha (Pulmozyme) 1 mg/mL nebulizer solution Inhale one (1) vial via nebulizer once a day (Patient not taking: Reported on 4/2/2025) 225 mL 3    elexacaftor-tezacaftor-ivacaft (Trikafta) 100-50-75 mg tablet TAKE TWO (2) ORANGE TABLETS BY MOUTH IN THE MORNING AND ONE (1) BLUE TABLET BY MOUTH IN THE EVENING. TAKE 12 HOURS APART WITH FATTY FOODS. 84 each 11    famotidine (Pepcid) 20 mg tablet Take 1 tablet (20 mg) by mouth 2 times a day.      FLUoxetine (PROzac) 20 mg capsule TAKE 1 ORAL CAPSULE ONCE A DAY WITH 40MG (TOTAL 60MG)      FLUoxetine (PROzac) 40 mg capsule Take 1 capsule (40 mg) by mouth once daily.      lipase-protease-amylase (Creon) 3,000-9,500- 15,000 unit capsule       multivitamin with minerals-folic acid-vitamin K-CoQ (DEKAs Plus) 200 mcg-1,000 mcg-10 mg capsule Take 1 capsule by mouth once daily.      nebulizers (LC Plus Nebulizer-Ped Mask) misc Please dispense one GetSet LC plus Nebulizer cup with mouthpiece and tubing with each refill of Pulmozyme. 1 each 11    norethindrone-e.estradioL-iron (Blisovi 24 Fe) 1 mg-20 mcg  (24)/75 mg (4) tablet Take 1 tablet by mouth once daily. 84 tablet 3     No current facility-administered medications on file prior to visit.       Do you have any new allergies? no new allergies reported    Allergies as of 04/02/2025 - Reviewed 04/02/2025   Allergen Reaction Noted    Cat dander Runny nose 01/03/2024    Ciprofloxacin Nausea/vomiting 06/28/2017    House dust Runny nose 01/03/2024       Have you been diagnosed with any new medical conditions? no new reported medical conditions    Patient Active Problem List   Diagnosis    Chronic constipation    Chronic ethmoidal sinusitis    Cystic fibrosis    Depression    Elevated serum GGT level    Exocrine pancreatic manifestation of cystic fibrosis (Multi)    JARED (generalized anxiety disorder)    Gastroesophageal reflux disease    Impaired glucose tolerance    Melanocytic nevi of trunk    Neoplasm of uncertain behavior of skin    Osteopenia    Pelvic pain in female    Pseudomonas aeruginosa infection    Abnormal oral glucose tolerance test       CFTR Genotype: [id number] / [id number]  Current CFTR Modulator: Trikafta  Dose: 2 orange tablets in the evening and 1 blue tablet in the morning    TOLERANCE:   Have you experienced any side effects from this medication? Side effects reported include: Some brain fog    Are there any changes to current therapy regimen? No changes reported    EFFICACY:    Have you developed any new symptoms of your condition? No changes reported    How do you feel your medication is affecting your disease state? - Venessa states taking Trikafta has positively affected her CF. She no longer coughs a lot, she has gained weight, gets sick less, and her appetite has increased. Overall, she just feels more normal.    GOALS:  Your goals of therapy are: Improved quality of life, Improve/maintain lung function, and Reduce frequency of illness    COMPLIANCE / ADHERENCE:  Have you had any unplanned missed doses?No  If yes, how often do you miss doses and is  there a particular contributing reason?     What barriers to adherence does the patient have? (Answer Y/N for all):  Patient has a difficult time remembering to take medications? No  Difficult administration technique?No  Medication cost? No  Patient does not think medication is beneficial?No  Other?   What actions were taken to address barriers?    Does the patient have any barriers to self-administration (including physical and mental)? No  List barriers:   Describe actions taken to mitigate barriers:    Labs  Lab Results   Component Value Date    WBC 9.5 01/06/2024    HGB 13.7 01/06/2024    HCT 41.6 01/06/2024     01/06/2024    CHOL 151 01/19/2023    TRIG 155 (H) 01/19/2023    HDL 44.5 01/19/2023    ALT 16 01/06/2024    AST 15 01/06/2024     01/06/2024    K 4.6 01/06/2024     01/06/2024    CREATININE 0.70 01/06/2024    BUN 16 01/06/2024    CO2 25 01/06/2024    TSH 2.45 01/06/2024    INR 1.1 01/19/2023    HGBA1C 5.4 01/19/2023       Pulmonary Function Tests     FEV1   Date/Time Value Ref Range Status   05/27/2024 09:19 PM 3.58 liters Final     Comment:     111%   05/01/2024 05:00 PM 3.60 liters Final     Comment:     112%          PATIENT MANAGEMENT:    Do you have any questions regarding your medications, or care? no additional questions    Do you have any concerns with access to your medications? No concerns expressed    How would you classify your Quality of Life? Doing well - 8    Fills medications at:  Specialty    How would you describe your satisfaction with  Specialty Pharmacy? Satisfied - 8    Do you have any additional suggestions to improve  Specialty Pharmacy services: n/a    IMPRESSION/PLAN:  Is patient high risk (potential patients:  pregnancy, geriatric, pediatric)?  n/a  Is laboratory follow-up needed? Liver function tests due annually. Last done: 1/6/2024  Is a clinical intervention needed? n/a  Continue therapies    Additional comments:   - Plans to have labs at [place]  either today or tomorrow (4/3)      [facility] Pharm[facility] Clinical Note  Initial Patient Education : Alyftrek (vanzacaftor/tezacaftor/deutivacaftor)    Introduction  Venessa Duran is a 30 y.o. female who is on the specialty pharmacy service for management of: Cystic Fibrosis    Date of education: 04/02/25    The most recent encounter visit with the referring prescriber Mary Marie MD, PhD on 04/02/25 was reviewed.  Pharmacy will continue to collaborate in the care of this patient with the referring prescriber.    Clinical Background  An initial assessment will be conducted prior to first fill of the medication to determine the appropriateness of therapy given the patient's diagnosis, medication list, comorbidities, allergies, medical history, patient's ability to self administer medication, and therapeutic goals based on possible outcomes of therapy.     Baseline Labs for clinical appropriateness that were reviewed include:   Cystic Fibrosis - LFTs and bilirubin:   Lab Results   Component Value Date    ALT 16 01/06/2024    AST 15 01/06/2024    BILITOT 0.4 01/06/2024        Package insert recommended follow up after beginning therapy:  -Hepatic function panel monthly x 6 months, then every 3 months x 12 months, then annually  -Pediatric patients: Annual eye exam    Education/Discussion  Venessa was contacted on 4/2/2025 at 2:23 PM for a pharmacy visit with encounter number [id number] from:   [facility]  [address]  [facility] [address]  [facility] [address]  Dept: [phone]  Dept Fax: [phone]  Loc: [phone]  Venessa consented to a/an In person visit, which was performed.    Medication receipt date: n/a - medication has not yet been ordered  Medication Start Date (planned or actual): n/a - still considering if to start therapy  Education was conducted prior to start of therapy? Yes  Duration of therapy:  Maintenance    Additional details of the medication specific counseling are found within the linked patient education flowsheet.       Clinical Summary:  - CFTR Mutations: [id number] / [id number]    - The Patient's medication list, allergies, and comorbid conditions were verified. No contraindications to therapy noted at this time.  - Patient advised to contact the pharmacy if there are any changes to the medication list, including prescriptions, OTC medications, herbal products, or supplements.   - Current clinically significant drug-drug interactions include:   Strong CYP3A inducers should not be used, Moderate to strong inhibitors of CYP3A necessitate dose adjustment    -Medication is clinically appropriate.    Education/Discussion:  Patient accepted the pharmacist's offer of counseling.    Indication: Cystic Fibrosis treatment    Alyftrek (vanzacaftor/tezacaftor/deutivacaftor)  Vanzacaftor 10mg/ tezacaftor 50mg/ deutivacaftor 125mg tablets: Take 2 tablets at one time every day    Mechanism of action: CFTR Modulator    Administration:   Dietary considerations:  Take with fat-containing food or drink    Does the patient have any barriers to self-administration (including physical and mental)? No     List barriers:      Describe actions taken to mitigate barriers:     Patient/caregiver counseled on proper technique for self-administration: Yes    Missed doses:  If 6 hours or less have passed since the missed dose, take the missed dose as soon as possible and continue on the original schedule.    If more than 6 hours has passed since the missed dose, skip the missed dose, and continue on the original schedule the next day.    Warnings, Precautions, and Adverse Reactions:   - Discussed common adverse effects, warnings and precautions pertinent to the medication including but not limited to: headache, upper respiratory tract infection symptoms, stomach pain, diarrhea, rash, nasal congestion, and increases in liver enzymes  -  Patient was encouraged to reach out to their doctor's office if they develop: any severe side effects such as head-to-toe rash      Handling and Storage   Store in the original container at room temperature in a cool dry place.     Disposal:  Unused,  or damaged doses should be properly disposed      The follow up timeline was discussed. Every person responds to and reacts to therapy differently. Patient should be assessed for efficacy and tolerability at approximately: next CF clinic visit and seen by CF pharmacist at least annually     Provided education on goals and possible outcomes of therapy:  Adherence with therapy  Timely completion of appropriate labs  Timely and appropriate follow up with provider  Identify and address medication interactions with presciption medications, OTC medications and supplements  Optimize or maintain quality of life    The importance of adherence was discussed and they were advised to take the medication as prescribed by their provider.     Impression/Plan    This patient has not been identified as high risk.    Patient expressed understanding of the educational points. They are still considering starting Alyftrek and will contact the CF clinic if they choose to pursue starting this medication.    Alyftrek is a specialty medication, and insurance often dictates where it can be filled. If you are able to choose to fill with  Specialty, see below for Welcome Packet information:    The  Specialty Pharmacy Welcome packet may be viewed here:   Specialty Pharmacy Welcome Packet       Or by scanning QR code:      Provided contact information ([phone]) for [facility] and reviewed dispensing process, refill timeline and patient management follow up. Advised to contact the pharmacy if there are any adverse effects and/or changes to medication list, including prescriptions, OTC medications, herbal products, or supplements. Confirmed understanding  of education conducted during assessment. All questions and concerns were addressed and patient was encouraged to reach out for additional questions or concerns.      Meli Elise, Zaida   4/2/2025 2:23 PM

## 2025-04-02 NOTE — PROGRESS NOTES
Respiratory Note:  Patient's Airway Clearance: [employer]ralung (does not use)  Frequency: N/A  Settings: N/A  Exercise: Walking the dog daily for 30 min  Oscillating Device: [employer] (not currently using)  [place]ough: PRN  Primary: Exercise  Secondary: [place] Cough    Aerosols: Name of medication, frequency, type of nebulizer used, Mask or Mouthpiece?  Bronchodilators: Albuterol in the past (not currently using)  Pulmozyme: Once Daily (not consistent)  Hypertonic Saline: No  Antibiotics: No (+Ringing in ears...no Alexis)  ICS/Combinations: No    Hearing Test: Not recently (last tested in 2019)  Spacer: Yes  Compressor: Has a maryanne vios pro  Home PFT Device: [employer]  Frequency: Not currently using (numbers were lower on home device)  Technique: good    Method of Cleaning Neb Cups: soap and water then   Method of Cleaning PFT Device: soap and sterile water, rinse with sterile water, soak in 3% hydrogen peroxide x 30 min, rinse with sterile water, allow to air dry completely.    Pharmacy for respiratory meds: [facility] in [place]  Patient Assistance Program: [employer]  Oxygen: No  Home Care Company:  LPM:  Continuous, nocturnal, PRN, intermittent w/exacerbation:   CPAP, BIPAP, Assisted Breathing (use at home or in-patient): No  Have you smoked cigarettes in the last year?: No  Are you exposed to second hand smoke or electronic cigarette vapor?: No  Does anyone in your household smoke cigarettes?: No  Did this patient use electronic cigarettes (vape) this year? No  During the reporting year, how often was this patient vaping? Not at all

## 2025-04-02 NOTE — PROGRESS NOTES
CR:  Reviewed with pt on:     Controlled Substance consent:     Needs:   Annual labs: DUE - (clarify if pt needs OGTT)   CXR: 11/2022  Audiology: 9/2019 normal - no ototoxic meds  DEXA: 11/2024  Liver US:   Flu vaccine: 11/11/2024  COVID  Booster: declined    *Tamiflu refill if needed - Needs a script     Nurse Assessment:     SOB: denied   2.    O2:  n/a  3.    Mediport: n/a  4.    Pt discussion/chief complaint/agenda:  Pt would like to discuss her high BP. Pt has been checking her BP at random daily at home. Average 130/95. Diastolic is usually worse. Both parents have high BP and have had MI. Father passed from MI. Paternal grandfather and grandmother both passed from MI.  Mother is on BP medication. Lungs are stable - no concerns.

## 2025-04-02 NOTE — PROGRESS NOTES
Alvina Pelletier M.D. [facility]    Plan from last visit:   Return in 3-6 months or as needed  Keep up the great work!  Let me know BEFORE you decide to try for a child  Think about removing some of the stress from your life       HPI    Your Annual Labs were labs were last done: 01/2024  Annual Labs are next due: 01/2025   Your last CXR was done: 11/2022  Your last DEXA scan was on: 11/2024  DEXA is next due: 11/2029  Your last audiology screening was: 9/2019      HPI  Interval history:       New house  New dog  New job  Doing well.    Thinking about getting pregnant - needs to get  tested for CF carrier first.      Respiratory Review of Systems:  Cough: none  Sputum:  none  Hemoptysis: none  Chest Pain: none  Wheezing: none  Other Respiratory Symptoms:   Oxygen: none  Transplant eval: none  exercise: Walks daily    Airway Clearance:   none  If does aerosols, does albuterol and pulmozyme, not using        GI Review of Systems:  Enzymes: on enzymes   Vitamins: on multivitamin and vitamin D   Supplements:  none  DUNIA:  getting heart burn after certain foods, has taken famotidine, 2 times a month  Abdominal Pain: no recent abdominal pain  Stools:    Other: is on Ozempic - lost 35 pounds  Just started again this January - semaglutide, eating more frequently  Has lost 7 pounds since January - on the 15 mg dose  [employer] is where she gets it in.        ENDO: no issues    Birth control:  OCP    Neuro:  + brain fog, forgetting things  Stressed - working on this with her therapist  Is asking for ADHD testing again,     Pain: no pain anywhere    Blood - blood pressure    Psych - prozac and wellbutrin both  Going to do ADHD testing  No depression, trouble focusing  Taking it every other day or so  Still seeing a counselor, not doing EMDR, will start again      Today was patient's annual visit  Please see case review document for  Patient's clinical care was discussed with the entire team and case review  "document was reviewed with team and patient  Team recommendations reviewed with patient.      Tdap  Yearly transplant discussion  No tobra in the future, use cayston if inhaled med needed  Annual labs  Audiology   PCV20 or PCV21 vaccine  DEXA - discussed  Liver ultrasound - discussed  Fertility/baby info - adjust vitamins when ready    Current Medications     Current Outpatient Medications   Medication Instructions    albuterol 2.5 mg /3 mL (0.083 %) nebulizer solution Inhale.    albuterol 90 mcg/actuation inhaler 2 puffs, Every 4 hours PRN    buPROPion XL (WELLBUTRIN XL) 300 mg, Daily before breakfast    cetirizine (ZyrTEC) 10 mg capsule Take by mouth.    dornase Alpha (Pulmozyme) 1 mg/mL nebulizer solution Inhale one (1) vial via nebulizer once a day    elexacaftor-tezacaftor-ivacaft (Trikafta) 100-50-75 mg tablet TAKE TWO (2) ORANGE TABLETS BY MOUTH IN THE MORNING AND ONE (1) BLUE TABLET BY MOUTH IN THE EVENING. TAKE 12 HOURS APART WITH FATTY FOODS.    famotidine (PEPCID) 20 mg, 2 times daily    FLUoxetine (PROzac) 20 mg capsule TAKE 1 ORAL CAPSULE ONCE A DAY WITH 40MG (TOTAL 60MG)    FLUoxetine (PROZAC) 40 mg, Daily    lipase-protease-amylase (Creon) 3,000-9,500- 15,000 unit capsule     multivitamin with minerals-folic acid-vitamin K-CoQ (DEKAs Plus) 200 mcg-1,000 mcg-10 mg capsule 1 capsule, Daily    nebulizers (LC Plus Nebulizer-Ped Mask) misc Please dispense one [employer] LC plus Nebulizer cup with mouthpiece and tubing with each refill of Pulmozyme.    norethindrone-e.estradioL-iron (Blisovi 24 Fe) 1 mg-20 mcg (24)/75 mg (4) tablet 1 tablet, oral, Daily    oseltamivir (TAMIFLU) 75 mg, oral, Every 12 hours       Physical Examination     BP (!) 136/93 (BP Location: Right arm, Patient Position: Sitting, BP Cuff Size: Adult)   Pulse 98   Temp 37.1 °C (98.8 °F) (Oral)   Resp 20   Ht 1.621 m (5' 3.82\")   Wt 79.2 kg (174 lb 7.9 oz)   SpO2 98%   BMI 30.12 kg/m²     GENERAL APPEARANCE: Healthy appearing, in no " "acute distress  SKIN: no rashes  HEAD, EYES, EARS, NOSE, THROAT:  Conjunctiva and sclera clear. Tympanic membranes normal. No rhinitis, nasal mucosa normal without polyps. Oropharynx unremarkable  NECK: No lymphadenopathy  CHEST: AP Diameter normal. No retractions. Auscultation reveals good air exchange without crackles or wheeze.   HEART: Normal rhythm, without murmur  ABDOMEN: Not distended. Normal bowel sounds. Soft and non-tender to palpation, without hepatomegaly or splenomegaly. No masses  EXTREMITIES: Without cyanosis or edema.   LYMPHATIC: No lymphadenopathy  MUSCULOSKELETAL: Unremarkable   NEUROLOGIC: Grossly intact        CF Sputum Culture     No results found for: \"AFBCX\"   Respiratory Culture, Cystic Fibrosis   Date/Time Value Ref Range Status   11/06/2024 01:24 PM (4+) Abundant Normal throat kaushik  Final   11/06/2024 01:24 PM (1+) Rare Pseudomonas aeruginosa mucoid (A)  Final       Spirometry:  11/06/2024  FVC 4.27 119%/ FEV1 3.45 113%/ 0.81 94%        Problem List Items Addressed This Visit       Chronic constipation    Relevant Orders    Comprehensive Metabolic Panel    CBC and Auto Differential    Gamma GT    Protime-INR    Vitamin D 25-Hydroxy,Total (for eval of Vitamin D levels)    Vitamin A    Vitamin E    IgE    TSH with reflex to Free T4 if abnormal    Lipid Panel    Hemoglobin A1C    Cystic fibrosis - Primary    Overview     Primary Cystic Fibrosis Physician: Dr. Mary Marie   Primary Care Physician: Dr. Alexus Lawrence     Initial Diagnosis: 3/27/1995 sweat chloride, 103  Genotype: [id number] / [id number]    CFTR modulator: Trikafta  no longer taking Zithromax  high dose ibuprofen: no    Respiratory: 02/22/24  - Baseline FEV1: 110.8% 01-03-24 in lab RBC  - Airway clearance: aerobika, exercise - walks dog most days  - Hypertonic saline: No  - Pulmozyme: yes Once Day  - Inhaled antibiotics: No (discontinued tobramycin)  - Other inhaled meds: yes albuterol solution  - Oxygen: " No  Recommendation/Summary: Daily exercise and/or airway clearance. Consider Cayston if needed in the future for pseudomonas. (patient reported ringing in right ear, diminished hearing)      History: pyelonephritis 2012  Hemoptysis: One episode likely due to sinuses   Pneumothorax: No  Sinus surgery: b/l endoscopic sinus surgery performed 3/19/15; septoplasty/inferior turbinate outfracture performed  CARLOS:   DVT: No  Fertility Issues: On birth control, has normal periods with this. July 2019 long discussion in regards to fertility and having children in the future when she's ready. Would like to talk to genetic counseling;     Procedures & Oral/IV course:  - has never required admission with IV abx for lungs  - admission inpatient 1/29-1/31/20 due to influenza B --> IVF, no IV abx  - oral abx for exacerbation: Levaquin (does not tolerate Cipro because it makes her sick)         Relevant Medications    oseltamivir (Tamiflu) 75 mg capsule    Other Relevant Orders    Comprehensive Metabolic Panel    CBC and Auto Differential    Gamma GT    Protime-INR    Vitamin D 25-Hydroxy,Total (for eval of Vitamin D levels)    Vitamin A    Vitamin E    IgE    TSH with reflex to Free T4 if abnormal    Lipid Panel    Hemoglobin A1C    US abdomen limited liver    Urinalysis with Reflex Microscopic    Glucose Tolerance Test, 2 hour (Non-Pregnancy)    Exocrine pancreatic manifestation of cystic fibrosis (Multi)    Relevant Orders    Comprehensive Metabolic Panel    CBC and Auto Differential    Gamma GT    Protime-INR    Vitamin D 25-Hydroxy,Total (for eval of Vitamin D levels)    Vitamin A    Vitamin E    IgE    TSH with reflex to Free T4 if abnormal    Lipid Panel    Hemoglobin A1C    US abdomen limited liver    JARED (generalized anxiety disorder)    Relevant Orders    Comprehensive Metabolic Panel    CBC and Auto Differential    Gamma GT    Protime-INR    Vitamin D 25-Hydroxy,Total (for eval of Vitamin D levels)    Vitamin A     Vitamin E    IgE    TSH with reflex to Free T4 if abnormal    Lipid Panel    Hemoglobin A1C    Impaired glucose tolerance    Relevant Orders    Comprehensive Metabolic Panel    CBC and Auto Differential    Gamma GT    Protime-INR    Vitamin D 25-Hydroxy,Total (for eval of Vitamin D levels)    Vitamin A    Vitamin E    IgE    TSH with reflex to Free T4 if abnormal    Lipid Panel    Hemoglobin A1C    Pseudomonas aeruginosa infection    Relevant Orders    Comprehensive Metabolic Panel    CBC and Auto Differential    Gamma GT    Protime-INR    Vitamin D 25-Hydroxy,Total (for eval of Vitamin D levels)    Vitamin A    Vitamin E    IgE    TSH with reflex to Free T4 if abnormal    Lipid Panel    Hemoglobin A1C     Other Visit Diagnoses       Brain fog        Relevant Orders    Referral to Neuropsychology                 # Cystic fibrosis without exacerbation  - grows PSA intermittently, as well as haemophilus and staph     ______________________________________________________________________________________  sick plan:  - can do doxy or Levaquin with good results  ______________________________________________________________________________________     #chest pain - stress test in 2024 negative  - no longer happening    # weight gain  - taking compounded GLP1        # anxiety   - started seeing a psych NP  - still not controlled     #. exocrine pancreatic insufficiency  - enzymes     # Right lower abdominal pain  - better     #. Osteopenia  - due for a DEXA scan     # high blood pressure without the diagnosis of HTN  - will start Nifedipine in the event that she is going to get pregnant    # family history of breast cancer   - start mammograms at 34  - discussed MRI screening      # derm  - gets skin cancer checks yearly    # brain fog  - frustrated that she's been on meds for years and no change in her mental health  - is going to start doing EMDR again  - stop the blue pill (Trikafta can lead to brain fog)  - refer to  neuropsych - she feels she may have ADHD and wants a second opinion    # considering starting a family  - refer  for CF carrier testing               Plan for this visit:  Stop the blue pill  Let me know how this goes  Nifedipine to pharmacy  Referral to Neuropsych  Get  tested for CF  Annual labs including OGTT  Tdap, PCV20  Dexa  Liver ultrasound        Follow-up:  Visit date not found     Mary Marie MD PhD   04/02/2025

## 2025-04-02 NOTE — PROGRESS NOTES
Subjective   Patient ID: Venessa Duran is a 30 y.o. female who presents for Cystic Fibrosis in outpatient clinic.    Social Work Visit Type: This is an Annual Visit for Venessa Duran  Location: [facility]    Identifying Information                              : 1995  Assessment date: 2025    Objective     Patient accompanied by:  self    Family System / Parents:    Raised by:  with biological parent(s) (1 parent)  Mother:  Name:  Sarina  Education: Highest Level of Education: High School  Father:  Name:    Education: Highest Level of Education: High School  Siblings & Children Information:  Siblings: brothers: 1 and sisters: 1    Relationships / Social History:  Patient lives with: patient lives with , Freya, dog Baljeet -[place]  Relationship status: Relationship Status / Family Dynamic: :  Yes  How Long?  6 years dating,  2    Marital/relationship status:  stable monogamous marriage  Does patient have adequate housing?:Owns home  Does patient feel safe in home?: Yes  Supportive Relationships: spouse/partner, friends/neighbors, and family    Education:  Education: Highest Level of Education: Post-Graduate Degree [employer]  Employer information: Place of employment , Occupation/How long , and Full-time  Spouse Education: Highest Level of Education: High School  Spouse Employer information: Full Time banker      Income / Insurance:  Total combined household income (not just patient's income): incomelist: $90,000 or more   Insurance Options:  Private  employee   If 26 or under: Was patient covered under parents' health insurance plan? N/A  Did the patient receive free medicine or co-pay assistance from a Patient Assistance Program? Yes, describe: [employer]    Disability  Are you on any form of disability? no    Adherence and Understanding of Health:  Knowledge of health:  good, Overall adherence:  fair, Adherence with medications:  fair, Managed by: patient,  "Understanding of medication:  good, and Adherence with appointments:   good    Cognition/Mood/Affect:  The patient was neatly groomed, appropriately dressed and adequately nourished.  What are the patient's psychosocial stressors:  normal work stress. Health is currently stressful.     Mood:   How has your mood been lately?: stressed and anxiety  How do you manage stress?: walks dog, going to get ADHD testing, setting boundaries with work and taking breaks.  What are your goals for this year?: try to focus on health and pay down credit card debt  What about having CF do you find challenging?  \"time commitment of meds and appts, how I feel is challenging sometimes\"    Thought Processes   Organized?  yes  Have you ever been hospitalized in a psychiatric hospital? no  Do you currently or have you ever seen a therapist/counselor/psychiatrist? yes  *If yes, indicate name/contact information: currently sees CNP for meds and sees a therapist  Have you used medications for mental health issues, sleep and/or pain now or in the past?  yes  *If yes, please indicate names/dosages/prescribing provider information:  see chart    Substance Use:  Alcohol - occasionally    Advance Directives  Do you have an advance directive/living will?  yes  power of  for healthcare on file     Impression:  Patient open and agreeable to completing AA/MHS. Patient bought and sold a house and then built a new house all within the past year, causing stress. First house [place] neighborhood. Patient reports stress over health issues. Patient is working with therapist and psych NP on ADHD testing to see if root of anxiety and depression.   SW engaged in active listening and supportive counseling. SW facilitated the expression of thoughts and feeling related to the issues noted above. SW reviewed contact information and availability for on-going support in between CF clinic appointments.      SW participated in a CF clinic huddle during " which time the patients care plan, treatment needs and issues were discussed. The information noted above has been shared with the CF team during CF clinic.     PHQ9: 11  GAD7: 15  Did patient have a positive suicide screen?  no    Assessment/Plan     Plan: Completed HCPOA. Named  as primary and mom as secondary.   On-going psychosocial and emotional support, supportive counseling and referrals as indicated to maximize adjustment to living with cystic fibrosis.    RUBIO Diaz  4/2/25

## 2025-04-02 NOTE — PATIENT INSTRUCTIONS
"    It was very nice to see you today. We can offer you in-person and \"virtual\" appointments with your cystic fibrosis provider.    If you need to cancel or schedule an appointment, please call the  at the [facility] at [phone] between the hours of 8 AM and 5 PM, Monday-Friday.    To reach the CF nurse, Melina, please call [phone]. Melina has a secure voice mail account if you want to leave a message.    If you need to speak with an adult cystic fibrosis provider between the hours of 5 PM and 8 AM (overnight) or anytime on Saturday or Sunday, please call [phone]. When you call this number, you will be connected to an  with the [facility] and [facility] answering service. You should tell the  that you're an adult with cystic fibrosis who needs to speak to the \"pediatric pulmonary doctor on-call.\" The  will take your contact information. You should then expect a call back from the cystic fibrosis doctor on-call within a short period of time.          Plan for this visit:  Stop the blue pill  Let me know how this goes  Nifedipine to pharmacy  Referral to Neuropsych  5.   Get  tested for CF  6.   Annual labs including OGTT  7.   Tdap, PCV20  8.   Dexa  9.   Liver ultrasound      Recommendations:    Tdap  Yearly transplant discussion  No tobra in the future, use cayston if inhaled med needed  Annual labs  Audiology?  PCV20 or PCV21 vaccine  DEXA  Liver ultrasound  Fertility/baby info - adjust vitamins,  needs tested    Date of Review:  3/31/2025  Date reviewed with pt:     Primary Cystic Fibrosis Physician: Dr. Mary Marie      Primary Care Physician:  no PCP listed     Diagnoses:  Cystic Fibrosis lung disease   Initial Diagnosis:      Genotype:  [id number] / [id number]  Sweat test: 3/27/1995   value: 103         Nursing:      Typically grows:  PSA  Last CF Cx: 11/6/2024 PSA    Last acid fast:    Last fungal culture:  11/6/2024 candida " albicans   Annual labs:  1/9/2024  CXR:11/21/2022  Audiology:  9/2019 normal - no ototoxic meds  Long term Azithromycin therapy: no  Long term aminoglycoside: no  IV access: peripheral   Hemoptysis: no  CARLOS: no  Fertility: On birth control, has normal periods with this. July 2019 long discussion in regards to fertility and having children in the future when she's ready. Would like to talk to genetic counseling;    Bio [employer]: Consented 2.24.2021  Flu Vaccine: 11/11/2024  COVID Booster: declined   Transplant/discussion:  clarify pt stance   PCP: no PCP listed   Last Seen: 11/6/2024  seen in [place]; Pt had stopped taking ozempic due to the affect of stress it was having on her.  She had lost 30lbs and gained 10 back. Her anxiety is not completely under control.  Seeing psych for mental health care. Pt is considering having a child. Was advised to talk to Dr. Marie before becoming pregnant.   Sick Plan: can do doxy or Levaquin with good results     Nursing Recommendation: Annual labs and hearing test for age appropriate     Melina Angel RN        Respiratory:  03/31/25    - Baseline FEV1:  113%   3.45L  11/06/24  - Airway clearance:  aerobika PRN  - Hypertonic saline: no  - Pulmozyme: PRN  - Inhaled antibiotics:  no (tobramycin discontinued - reported ringing in her right ear / consider cayston if needed in the future)  - Other inhaled meds: albuterol PRN  - Oxygen: No    Recommendation/Summary: continue current regimen / daily exercise. Consider Cayston if needed in the future for pseudomonas.        Nutritional Assessment:      -----NUTRITION EN 3-25 RD Annual Assessment Needs 25     Nutritional Status BMI >25     Body Composition SHIRAZ: SLM 85.8 (fit), PBF 43.1%-Repeat due; HGS >1 SD below average for age     Weight Intervention Ozempic    Body Image Pt. Happy with wt. Loss-Would like to keep losing     Supplements/TF None     Seen by GI Last seen 21; Not interested in F/U     GI Symptom  Tracker Needs 25    Balanced Diet Yes     Calcium Intake Low-Adding oat and almond milk     Programs (Enzyme /WIC/ [facility]) Careforward     Pancreatic Enzymes Creon     DEXA Ordered 2/2022; Osteopenia 19; Repeat due     OGTT IGT    Seen by Endo No     Vitamin Levels Needs 25; 2024 GPR 0.4     Liver US: 2015    Colonoscopy WNL; Repeat at 34 years old     Transplant/Pregnancy Nutrition Needs N/A     Recommendation/summary:   Annual labs  Liver US?    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 She has been on Trikafta since Nov 2019. Doing well with Trikafta. Takes 1 blue tab every AM and 2 orange tabs every PM (due to headaches, improved on this regimen)  Medication related labs: Last LFTs were Jan 2024, within normal limits. Due now  Drug-drug interactions were evaluated, no significant drug-drug interactions requiring therapy change identified.  Medication access: Meds typically obtained from [facility]. Issue with obtaining Ozempic, confirm if resolved.  Immunizations: PPSV23 received in 2023, eligible for PCV20 or PCV21 vaccines. Last Tdap recorded as 3/5/10, due every 10 years.   Summary: Continue therapies. Tdap and PCV20 or PCV21. Confirm if Ozempic was able to be obtained. LFTs due now.   Lynn Yanes, ZeinabD, BCPS, BCPPS      Research:      -not eligible for current studies

## 2025-04-03 LAB
MGC ASCENT PFT - FEV1 - PRE: 3.46
MGC ASCENT PFT - FEV1 - PREDICTED: 3.03
MGC ASCENT PFT - FVC - PRE: 4.24
MGC ASCENT PFT - FVC - PREDICTED: 3.56

## 2025-04-03 RX ORDER — NIFEDIPINE 30 MG/1
30 TABLET, FILM COATED, EXTENDED RELEASE ORAL
Qty: 30 TABLET | Refills: 11 | Status: SHIPPED | OUTPATIENT
Start: 2025-04-03 | End: 2026-04-03

## 2025-04-04 LAB
FUNGUS SPEC CULT: ABNORMAL
FUNGUS SPEC FUNGUS STN: ABNORMAL

## 2025-04-06 LAB — BACTERIA SPT CF RESP CULT: NORMAL

## 2025-04-08 ENCOUNTER — TELEPHONE (OUTPATIENT)
Dept: PEDIATRIC PULMONOLOGY | Facility: HOSPITAL | Age: 30
End: 2025-04-08
Payer: COMMERCIAL

## 2025-04-08 NOTE — TELEPHONE ENCOUNTER
----- Message from Mary Marie sent at 4/3/2025  8:18 PM EDT -----  Francisco Summers,  I hope you are well.  Did you give Venessa the information to get her  tested for CF gene mutations?  Thank you,  Alondra

## 2025-04-09 NOTE — TELEPHONE ENCOUNTER
RN spoke to Waleska Syed for genetic testing for pt's spouse. She recommended they make an apt with [facility] and schedule for a pre-conception genetic counseling. [phone].      RN called pt with the recommendation. Pt verbalized understanding and will call for an apt. Pt had questions about the testing. RN deferred questions for [facility].

## 2025-04-14 ENCOUNTER — HOSPITAL ENCOUNTER (OUTPATIENT)
Dept: RADIOLOGY | Facility: CLINIC | Age: 30
End: 2025-04-14
Payer: COMMERCIAL

## 2025-04-14 DIAGNOSIS — E84.8 EXOCRINE PANCREATIC MANIFESTATION OF CYSTIC FIBROSIS (MULTI): ICD-10-CM

## 2025-04-14 DIAGNOSIS — E84.9 CYSTIC FIBROSIS: ICD-10-CM

## 2025-04-14 PROCEDURE — 76705 ECHO EXAM OF ABDOMEN: CPT

## 2025-04-19 LAB
25(OH)D3+25(OH)D2 SERPL-MCNC: 32 NG/ML (ref 30–100)
A-TOCOPHEROL VIT E SERPL-MCNC: NORMAL
ALBUMIN SERPL-MCNC: 4.3 G/DL (ref 3.6–5.1)
ALBUMIN/GLOB SERPL: 1.7 (CALC) (ref 1–2.5)
ALP SERPL-CCNC: 46 U/L (ref 31–125)
ALT SERPL-CCNC: 21 U/L (ref 6–29)
APPEARANCE UR: ABNORMAL
AST SERPL-CCNC: 20 U/L (ref 10–30)
BACTERIA #/AREA URNS HPF: ABNORMAL /HPF
BASOPHILS # BLD AUTO: 38 CELLS/UL (ref 0–200)
BASOPHILS NFR BLD AUTO: 0.5 %
BETA+GAMMA TOCOPHEROL SERPL-MCNC: NORMAL MG/DL
BILIRUB SERPL-MCNC: 0.5 MG/DL (ref 0.2–1.2)
BILIRUB UR QL STRIP: NEGATIVE
BUN SERPL-MCNC: 15 MG/DL (ref 7–25)
BUN/CREAT SERPL: NORMAL (CALC) (ref 6–22)
CALCIUM SERPL-MCNC: 9.1 MG/DL (ref 8.6–10.2)
CAOX CRY #/AREA URNS HPF: ABNORMAL /HPF
CHLORIDE SERPL-SCNC: 105 MMOL/L (ref 98–110)
CHOLEST SERPL-MCNC: 151 MG/DL
CHOLEST/HDLC SERPL: 2.6 (CALC)
CO2 SERPL-SCNC: 24 MMOL/L (ref 20–32)
COLOR UR: ABNORMAL
CREAT SERPL-MCNC: 0.7 MG/DL (ref 0.5–0.97)
EGFRCR SERPLBLD CKD-EPI 2021: 119 ML/MIN/1.73M2
EOSINOPHIL # BLD AUTO: 90 CELLS/UL (ref 15–500)
EOSINOPHIL NFR BLD AUTO: 1.2 %
ERYTHROCYTE [DISTWIDTH] IN BLOOD BY AUTOMATED COUNT: 12.4 % (ref 11–15)
EST. AVERAGE GLUCOSE BLD GHB EST-MCNC: 105 MG/DL
EST. AVERAGE GLUCOSE BLD GHB EST-SCNC: 5.8 MMOL/L
GLOBULIN SER CALC-MCNC: 2.6 G/DL (CALC) (ref 1.9–3.7)
GLUCOSE 2H P 75 G GLC PO SERPL-MCNC: 37 MG/DL
GLUCOSE P FAST SERPL-MCNC: 81 MG/DL (ref 65–99)
GLUCOSE SERPL-MCNC: 81 MG/DL (ref 65–99)
GLUCOSE UR QL STRIP: NEGATIVE
HBA1C MFR BLD: 5.3 %
HCT VFR BLD AUTO: 41.7 % (ref 35–45)
HDLC SERPL-MCNC: 59 MG/DL
HGB BLD-MCNC: 13.7 G/DL (ref 11.7–15.5)
HGB UR QL STRIP: NEGATIVE
HYALINE CASTS #/AREA URNS LPF: ABNORMAL /LPF
IGE SERPL-ACNC: NORMAL [IU]/L
INR PPP: 1
KETONES UR QL STRIP: ABNORMAL
LDLC SERPL CALC-MCNC: 72 MG/DL (CALC)
LEUKOCYTE ESTERASE UR QL STRIP: ABNORMAL
LYMPHOCYTES # BLD AUTO: 2685 CELLS/UL (ref 850–3900)
LYMPHOCYTES NFR BLD AUTO: 35.8 %
MCH RBC QN AUTO: 30.5 PG (ref 27–33)
MCHC RBC AUTO-ENTMCNC: 32.9 G/DL (ref 32–36)
MCV RBC AUTO: 92.9 FL (ref 80–100)
MONOCYTES # BLD AUTO: 540 CELLS/UL (ref 200–950)
MONOCYTES NFR BLD AUTO: 7.2 %
NEUTROPHILS # BLD AUTO: 4148 CELLS/UL (ref 1500–7800)
NEUTROPHILS NFR BLD AUTO: 55.3 %
NITRITE UR QL STRIP: NEGATIVE
NONHDLC SERPL-MCNC: 92 MG/DL (CALC)
PH UR STRIP: 5.5 [PH] (ref 5–8)
PLATELET # BLD AUTO: 422 THOUSAND/UL (ref 140–400)
PMV BLD REES-ECKER: 10 FL (ref 7.5–12.5)
POTASSIUM SERPL-SCNC: 4.4 MMOL/L (ref 3.5–5.3)
PROT SERPL-MCNC: 6.9 G/DL (ref 6.1–8.1)
PROT UR QL STRIP: NEGATIVE
PROTHROMBIN TIME: 11.1 SEC (ref 9–11.5)
RBC # BLD AUTO: 4.49 MILLION/UL (ref 3.8–5.1)
RBC #/AREA URNS HPF: ABNORMAL /HPF
SERVICE CMNT-IMP: ABNORMAL
SERVICE CMNT-IMP: ABNORMAL
SODIUM SERPL-SCNC: 139 MMOL/L (ref 135–146)
SP GR UR STRIP: 1.02 (ref 1–1.03)
SQUAMOUS #/AREA URNS HPF: ABNORMAL /HPF
TRIGL SERPL-MCNC: 121 MG/DL
TSH SERPL-ACNC: 3.49 MIU/L
VIT A SERPL-MCNC: NORMAL UG/ML
WBC # BLD AUTO: 7.5 THOUSAND/UL (ref 3.8–10.8)
WBC #/AREA URNS HPF: ABNORMAL /HPF

## 2025-04-22 LAB
25(OH)D3+25(OH)D2 SERPL-MCNC: 32 NG/ML (ref 30–100)
A-TOCOPHEROL VIT E SERPL-MCNC: 21.7 MG/L (ref 5.7–19.9)
ALBUMIN SERPL-MCNC: 4.3 G/DL (ref 3.6–5.1)
ALBUMIN/GLOB SERPL: 1.7 (CALC) (ref 1–2.5)
ALP SERPL-CCNC: 46 U/L (ref 31–125)
ALT SERPL-CCNC: 21 U/L (ref 6–29)
APPEARANCE UR: ABNORMAL
AST SERPL-CCNC: 20 U/L (ref 10–30)
BACTERIA #/AREA URNS HPF: ABNORMAL /HPF
BASOPHILS # BLD AUTO: 38 CELLS/UL (ref 0–200)
BASOPHILS NFR BLD AUTO: 0.5 %
BETA+GAMMA TOCOPHEROL SERPL-MCNC: <1 MG/L
BILIRUB SERPL-MCNC: 0.5 MG/DL (ref 0.2–1.2)
BILIRUB UR QL STRIP: NEGATIVE
BUN SERPL-MCNC: 15 MG/DL (ref 7–25)
BUN/CREAT SERPL: NORMAL (CALC) (ref 6–22)
CALCIUM SERPL-MCNC: 9.1 MG/DL (ref 8.6–10.2)
CAOX CRY #/AREA URNS HPF: ABNORMAL /HPF
CHLORIDE SERPL-SCNC: 105 MMOL/L (ref 98–110)
CHOLEST SERPL-MCNC: 151 MG/DL
CHOLEST/HDLC SERPL: 2.6 (CALC)
CO2 SERPL-SCNC: 24 MMOL/L (ref 20–32)
COLOR UR: ABNORMAL
CREAT SERPL-MCNC: 0.7 MG/DL (ref 0.5–0.97)
EGFRCR SERPLBLD CKD-EPI 2021: 119 ML/MIN/1.73M2
EOSINOPHIL # BLD AUTO: 90 CELLS/UL (ref 15–500)
EOSINOPHIL NFR BLD AUTO: 1.2 %
ERYTHROCYTE [DISTWIDTH] IN BLOOD BY AUTOMATED COUNT: 12.4 % (ref 11–15)
EST. AVERAGE GLUCOSE BLD GHB EST-MCNC: 105 MG/DL
EST. AVERAGE GLUCOSE BLD GHB EST-SCNC: 5.8 MMOL/L
GLOBULIN SER CALC-MCNC: 2.6 G/DL (CALC) (ref 1.9–3.7)
GLUCOSE SERPL-MCNC: 81 MG/DL (ref 65–99)
GLUCOSE UR QL STRIP: NEGATIVE
HBA1C MFR BLD: 5.3 %
HCT VFR BLD AUTO: 41.7 % (ref 35–45)
HDLC SERPL-MCNC: 59 MG/DL
HGB BLD-MCNC: 13.7 G/DL (ref 11.7–15.5)
HGB UR QL STRIP: NEGATIVE
HYALINE CASTS #/AREA URNS LPF: ABNORMAL /LPF
IGE SERPL-ACNC: 14 KU/L
INR PPP: 1
KETONES UR QL STRIP: ABNORMAL
LDLC SERPL CALC-MCNC: 72 MG/DL (CALC)
LEUKOCYTE ESTERASE UR QL STRIP: ABNORMAL
LYMPHOCYTES # BLD AUTO: 2685 CELLS/UL (ref 850–3900)
LYMPHOCYTES NFR BLD AUTO: 35.8 %
MCH RBC QN AUTO: 30.5 PG (ref 27–33)
MCHC RBC AUTO-ENTMCNC: 32.9 G/DL (ref 32–36)
MCV RBC AUTO: 92.9 FL (ref 80–100)
MONOCYTES # BLD AUTO: 540 CELLS/UL (ref 200–950)
MONOCYTES NFR BLD AUTO: 7.2 %
NEUTROPHILS # BLD AUTO: 4148 CELLS/UL (ref 1500–7800)
NEUTROPHILS NFR BLD AUTO: 55.3 %
NITRITE UR QL STRIP: NEGATIVE
NONHDLC SERPL-MCNC: 92 MG/DL (CALC)
PH UR STRIP: 5.5 [PH] (ref 5–8)
PLATELET # BLD AUTO: 422 THOUSAND/UL (ref 140–400)
PMV BLD REES-ECKER: 10 FL (ref 7.5–12.5)
POTASSIUM SERPL-SCNC: 4.4 MMOL/L (ref 3.5–5.3)
PROT SERPL-MCNC: 6.9 G/DL (ref 6.1–8.1)
PROT UR QL STRIP: NEGATIVE
PROTHROMBIN TIME: 11.1 SEC (ref 9–11.5)
RBC # BLD AUTO: 4.49 MILLION/UL (ref 3.8–5.1)
RBC #/AREA URNS HPF: ABNORMAL /HPF
SERVICE CMNT-IMP: ABNORMAL
SODIUM SERPL-SCNC: 139 MMOL/L (ref 135–146)
SP GR UR STRIP: 1.02 (ref 1–1.03)
SQUAMOUS #/AREA URNS HPF: ABNORMAL /HPF
TRIGL SERPL-MCNC: 121 MG/DL
TSH SERPL-ACNC: 3.49 MIU/L
VIT A SERPL-MCNC: 57 MCG/DL (ref 38–98)
WBC # BLD AUTO: 7.5 THOUSAND/UL (ref 3.8–10.8)
WBC #/AREA URNS HPF: ABNORMAL /HPF

## 2025-04-27 ENCOUNTER — APPOINTMENT (OUTPATIENT)
Dept: CARDIOLOGY | Facility: HOSPITAL | Age: 30
End: 2025-04-27
Payer: COMMERCIAL

## 2025-04-27 ENCOUNTER — APPOINTMENT (OUTPATIENT)
Dept: RADIOLOGY | Facility: HOSPITAL | Age: 30
End: 2025-04-27
Payer: COMMERCIAL

## 2025-04-27 ENCOUNTER — HOSPITAL ENCOUNTER (EMERGENCY)
Facility: HOSPITAL | Age: 30
Discharge: HOME | End: 2025-04-27
Attending: STUDENT IN AN ORGANIZED HEALTH CARE EDUCATION/TRAINING PROGRAM
Payer: COMMERCIAL

## 2025-04-27 VITALS
HEIGHT: 63 IN | WEIGHT: 176.37 LBS | HEART RATE: 99 BPM | TEMPERATURE: 97.8 F | BODY MASS INDEX: 31.25 KG/M2 | OXYGEN SATURATION: 98 % | DIASTOLIC BLOOD PRESSURE: 87 MMHG | RESPIRATION RATE: 19 BRPM | SYSTOLIC BLOOD PRESSURE: 154 MMHG

## 2025-04-27 DIAGNOSIS — D72.829 LEUKOCYTOSIS, UNSPECIFIED TYPE: ICD-10-CM

## 2025-04-27 DIAGNOSIS — E87.20 LACTIC ACIDOSIS: ICD-10-CM

## 2025-04-27 DIAGNOSIS — R56.9 SEIZURE-LIKE ACTIVITY (MULTI): Primary | ICD-10-CM

## 2025-04-27 DIAGNOSIS — R11.0 NAUSEA: ICD-10-CM

## 2025-04-27 LAB
ALBUMIN SERPL BCP-MCNC: 4.5 G/DL (ref 3.4–5)
ALP SERPL-CCNC: 48 U/L (ref 33–110)
ALT SERPL W P-5'-P-CCNC: 24 U/L (ref 7–45)
AMPHETAMINES UR QL SCN: NORMAL
ANION GAP SERPL CALCULATED.3IONS-SCNC: 22 MMOL/L (ref 10–20)
APAP SERPL-MCNC: <10 UG/ML (ref ?–30)
APPEARANCE UR: CLEAR
AST SERPL W P-5'-P-CCNC: 28 U/L (ref 9–39)
B-HCG SERPL-ACNC: <2 MIU/ML
BACTERIA #/AREA URNS AUTO: ABNORMAL /HPF
BARBITURATES UR QL SCN: NORMAL
BASOPHILS # BLD AUTO: 0.09 X10*3/UL (ref 0–0.1)
BASOPHILS NFR BLD AUTO: 0.5 %
BENZODIAZ UR QL SCN: NORMAL
BILIRUB SERPL-MCNC: 0.6 MG/DL (ref 0–1.2)
BILIRUB UR STRIP.AUTO-MCNC: NEGATIVE MG/DL
BUN SERPL-MCNC: 12 MG/DL (ref 6–23)
BZE UR QL SCN: NORMAL
CALCIUM SERPL-MCNC: 9 MG/DL (ref 8.6–10.3)
CANNABINOIDS UR QL SCN: NORMAL
CARDIAC TROPONIN I PNL SERPL HS: <3 NG/L (ref 0–13)
CARDIAC TROPONIN I PNL SERPL HS: <3 NG/L (ref 0–13)
CHLORIDE SERPL-SCNC: 99 MMOL/L (ref 98–107)
CO2 SERPL-SCNC: 16 MMOL/L (ref 21–32)
COLOR UR: ABNORMAL
CREAT SERPL-MCNC: 0.77 MG/DL (ref 0.5–1.05)
EGFRCR SERPLBLD CKD-EPI 2021: >90 ML/MIN/1.73M*2
EOSINOPHIL # BLD AUTO: 0.04 X10*3/UL (ref 0–0.7)
EOSINOPHIL NFR BLD AUTO: 0.2 %
ERYTHROCYTE [DISTWIDTH] IN BLOOD BY AUTOMATED COUNT: 13.2 % (ref 11.5–14.5)
ETHANOL SERPL-MCNC: <10 MG/DL
FENTANYL+NORFENTANYL UR QL SCN: NORMAL
FLUAV RNA RESP QL NAA+PROBE: NOT DETECTED
FLUBV RNA RESP QL NAA+PROBE: NOT DETECTED
GLUCOSE SERPL-MCNC: 103 MG/DL (ref 74–99)
GLUCOSE UR STRIP.AUTO-MCNC: NORMAL MG/DL
HCT VFR BLD AUTO: 44 % (ref 36–46)
HGB BLD-MCNC: 14.5 G/DL (ref 12–16)
IMM GRANULOCYTES # BLD AUTO: 0.07 X10*3/UL (ref 0–0.7)
IMM GRANULOCYTES NFR BLD AUTO: 0.4 % (ref 0–0.9)
KETONES UR STRIP.AUTO-MCNC: ABNORMAL MG/DL
LACTATE SERPL-SCNC: 1.6 MMOL/L (ref 0.4–2)
LACTATE SERPL-SCNC: 7.5 MMOL/L (ref 0.4–2)
LEUKOCYTE ESTERASE UR QL STRIP.AUTO: NEGATIVE
LYMPHOCYTES # BLD AUTO: 2.69 X10*3/UL (ref 1.2–4.8)
LYMPHOCYTES NFR BLD AUTO: 15.6 %
MAGNESIUM SERPL-MCNC: 2.19 MG/DL (ref 1.6–2.4)
MCH RBC QN AUTO: 30.1 PG (ref 26–34)
MCHC RBC AUTO-ENTMCNC: 33 G/DL (ref 32–36)
MCV RBC AUTO: 92 FL (ref 80–100)
METHADONE UR QL SCN: NORMAL
MONOCYTES # BLD AUTO: 0.85 X10*3/UL (ref 0.1–1)
MONOCYTES NFR BLD AUTO: 4.9 %
MUCOUS THREADS #/AREA URNS AUTO: ABNORMAL /LPF
NEUTROPHILS # BLD AUTO: 13.46 X10*3/UL (ref 1.2–7.7)
NEUTROPHILS NFR BLD AUTO: 78.4 %
NITRITE UR QL STRIP.AUTO: NEGATIVE
NRBC BLD-RTO: 0 /100 WBCS (ref 0–0)
OPIATES UR QL SCN: NORMAL
OXYCODONE+OXYMORPHONE UR QL SCN: NORMAL
PCP UR QL SCN: NORMAL
PH UR STRIP.AUTO: 6 [PH]
PLATELET # BLD AUTO: 437 X10*3/UL (ref 150–450)
POTASSIUM SERPL-SCNC: 4 MMOL/L (ref 3.5–5.3)
PROT SERPL-MCNC: 7.5 G/DL (ref 6.4–8.2)
PROT UR STRIP.AUTO-MCNC: NEGATIVE MG/DL
RBC # BLD AUTO: 4.81 X10*6/UL (ref 4–5.2)
RBC # UR STRIP.AUTO: ABNORMAL MG/DL
RBC #/AREA URNS AUTO: ABNORMAL /HPF
RSV RNA RESP QL NAA+PROBE: NOT DETECTED
SALICYLATES SERPL-MCNC: <3 MG/DL (ref ?–20)
SARS-COV-2 RNA RESP QL NAA+PROBE: NOT DETECTED
SODIUM SERPL-SCNC: 133 MMOL/L (ref 136–145)
SP GR UR STRIP.AUTO: 1.01
SQUAMOUS #/AREA URNS AUTO: ABNORMAL /HPF
UROBILINOGEN UR STRIP.AUTO-MCNC: NORMAL MG/DL
WBC # BLD AUTO: 17.2 X10*3/UL (ref 4.4–11.3)
WBC #/AREA URNS AUTO: ABNORMAL /HPF

## 2025-04-27 PROCEDURE — 84146 ASSAY OF PROLACTIN: CPT | Mod: TRILAB | Performed by: CLINICAL NURSE SPECIALIST

## 2025-04-27 PROCEDURE — 70450 CT HEAD/BRAIN W/O DYE: CPT | Performed by: RADIOLOGY

## 2025-04-27 PROCEDURE — 80053 COMPREHEN METABOLIC PANEL: CPT | Performed by: CLINICAL NURSE SPECIALIST

## 2025-04-27 PROCEDURE — 71046 X-RAY EXAM CHEST 2 VIEWS: CPT

## 2025-04-27 PROCEDURE — 93005 ELECTROCARDIOGRAM TRACING: CPT

## 2025-04-27 PROCEDURE — 36415 COLL VENOUS BLD VENIPUNCTURE: CPT | Performed by: CLINICAL NURSE SPECIALIST

## 2025-04-27 PROCEDURE — 85025 COMPLETE CBC W/AUTO DIFF WBC: CPT | Performed by: CLINICAL NURSE SPECIALIST

## 2025-04-27 PROCEDURE — 99285 EMERGENCY DEPT VISIT HI MDM: CPT | Mod: 25 | Performed by: STUDENT IN AN ORGANIZED HEALTH CARE EDUCATION/TRAINING PROGRAM

## 2025-04-27 PROCEDURE — 84484 ASSAY OF TROPONIN QUANT: CPT | Performed by: CLINICAL NURSE SPECIALIST

## 2025-04-27 PROCEDURE — 80320 DRUG SCREEN QUANTALCOHOLS: CPT | Performed by: CLINICAL NURSE SPECIALIST

## 2025-04-27 PROCEDURE — 83605 ASSAY OF LACTIC ACID: CPT | Performed by: CLINICAL NURSE SPECIALIST

## 2025-04-27 PROCEDURE — 71046 X-RAY EXAM CHEST 2 VIEWS: CPT | Mod: FOREIGN READ | Performed by: RADIOLOGY

## 2025-04-27 PROCEDURE — 80307 DRUG TEST PRSMV CHEM ANLYZR: CPT | Performed by: CLINICAL NURSE SPECIALIST

## 2025-04-27 PROCEDURE — 87637 SARSCOV2&INF A&B&RSV AMP PRB: CPT | Performed by: CLINICAL NURSE SPECIALIST

## 2025-04-27 PROCEDURE — 81001 URINALYSIS AUTO W/SCOPE: CPT | Performed by: CLINICAL NURSE SPECIALIST

## 2025-04-27 PROCEDURE — 70450 CT HEAD/BRAIN W/O DYE: CPT

## 2025-04-27 PROCEDURE — 96361 HYDRATE IV INFUSION ADD-ON: CPT

## 2025-04-27 PROCEDURE — 2500000004 HC RX 250 GENERAL PHARMACY W/ HCPCS (ALT 636 FOR OP/ED): Mod: JZ | Performed by: CLINICAL NURSE SPECIALIST

## 2025-04-27 PROCEDURE — 83735 ASSAY OF MAGNESIUM: CPT | Performed by: CLINICAL NURSE SPECIALIST

## 2025-04-27 PROCEDURE — 96374 THER/PROPH/DIAG INJ IV PUSH: CPT

## 2025-04-27 PROCEDURE — 87040 BLOOD CULTURE FOR BACTERIA: CPT | Mod: TRILAB | Performed by: CLINICAL NURSE SPECIALIST

## 2025-04-27 PROCEDURE — 84702 CHORIONIC GONADOTROPIN TEST: CPT | Performed by: CLINICAL NURSE SPECIALIST

## 2025-04-27 RX ORDER — ONDANSETRON 4 MG/1
4 TABLET, ORALLY DISINTEGRATING ORAL EVERY 8 HOURS PRN
Qty: 15 TABLET | Refills: 0 | Status: SHIPPED | OUTPATIENT
Start: 2025-04-27 | End: 2025-05-02

## 2025-04-27 RX ORDER — ONDANSETRON HYDROCHLORIDE 2 MG/ML
4 INJECTION, SOLUTION INTRAVENOUS ONCE
Status: COMPLETED | OUTPATIENT
Start: 2025-04-27 | End: 2025-04-27

## 2025-04-27 RX ADMIN — SODIUM CHLORIDE 2400 ML: 9 INJECTION, SOLUTION INTRAVENOUS at 13:00

## 2025-04-27 RX ADMIN — ONDANSETRON 4 MG: 2 INJECTION, SOLUTION INTRAMUSCULAR; INTRAVENOUS at 16:51

## 2025-04-27 ASSESSMENT — COLUMBIA-SUICIDE SEVERITY RATING SCALE - C-SSRS
2. HAVE YOU ACTUALLY HAD ANY THOUGHTS OF KILLING YOURSELF?: NO
6. HAVE YOU EVER DONE ANYTHING, STARTED TO DO ANYTHING, OR PREPARED TO DO ANYTHING TO END YOUR LIFE?: NO
1. IN THE PAST MONTH, HAVE YOU WISHED YOU WERE DEAD OR WISHED YOU COULD GO TO SLEEP AND NOT WAKE UP?: NO

## 2025-04-27 ASSESSMENT — PAIN SCALES - GENERAL
PAINLEVEL_OUTOF10: 0 - NO PAIN
PAINLEVEL_OUTOF10: 0 - NO PAIN

## 2025-04-27 ASSESSMENT — PAIN - FUNCTIONAL ASSESSMENT: PAIN_FUNCTIONAL_ASSESSMENT: 0-10

## 2025-04-27 NOTE — Clinical Note
Venessa Duran was seen and treated in our emergency department on 4/27/2025.  She may return to work on 04/30/2025.  1-3 days if needed no driving or operating machinery until follow-up with primary care physician.     If you have any questions or concerns, please don't hesitate to call.      Alexandro Zepeda MD

## 2025-04-27 NOTE — ED PROVIDER NOTES
Department of Emergency Medicine   ED  Provider Note  Admit Date/RoomTime: 4/27/2025 12:15 PM  ED Room: [place] [address]        History of Present Illness:  Chief Complaint   Patient presents with    Altered Mental Status     Pt  started cpr when he found wife choking after going to lay down in bed. Pt AxOx4 with patent airway at this time          Venessa Duran is a 30 y.o. female history of chronic constipation, cystic fibrosis, depression, general anxiety, reflux presents to the emergency department with complaints of altered mental status.  Per EMS report patient went into lay down  heard her choking then patient experienced a syncopal episode with confusion.  Started CPR and patient was altered after having the episode.  Per patient.  Patient states that she got home early this morning after drinking heavily all night.  She is unclear of what happened this morning.  She denies any fever chills cough congestion runny nose sore throat no abdominal pain no nausea no vomiting.  States she was in her normal state of health before going to bed last night from which.  Patient states she was drinking heavily all night long    Review of Systems:   Pertinent positives and negatives are stated within HPI, all other systems reviewed and are negative.        --------------------------------------------- PAST HISTORY ---------------------------------------------  Past Medical History:  has a past medical history of Abnormal coagulation profile (12/31/2015), Abnormal uterine and vaginal bleeding, unspecified (07/20/2015), Acute candidiasis of vulva and vagina (02/22/2016), Acute pharyngitis, unspecified (01/14/2020), Acute upper respiratory infection, unspecified (12/26/2021), Burn of mouth and pharynx, initial encounter (02/27/2019), Contact with and (suspected) exposure to covid-19 (12/26/2021), Encounter for removal of intrauterine contraceptive device (07/20/2015), Encounter for screening for infections with a  predominantly sexual mode of transmission (02/22/2016), Encounter for screening for infections with a predominantly sexual mode of transmission (05/15/2017), Epigastric pain (11/25/2015), Inflammatory conditions of jaws (03/04/2019), Influenza due to other identified influenza virus with other respiratory manifestations (01/31/2020), Other conditions influencing health status (02/22/2016), Other conditions influencing health status (11/20/2015), Other conditions influencing health status (05/14/2019), Other conditions influencing health status (09/12/2021), Other disturbances of smell and taste (03/26/2019), Other general symptoms and signs (01/14/2020), Other specified abnormal findings of blood chemistry (02/19/2020), Other specified anxiety disorders (06/07/2021), Other specified diseases of pancreas (05/26/2018), Other symptoms and signs involving general sensations and perceptions (12/03/2020), Pain in throat (09/12/2021), Personal history of other diseases of the digestive system (02/17/2016), Personal history of other diseases of the digestive system (01/07/2016), Personal history of other diseases of the female genital tract (12/08/2020), Personal history of other diseases of the musculoskeletal system and connective tissue (02/07/2020), Personal history of other diseases of the musculoskeletal system and connective tissue (09/27/2019), Personal history of other diseases of the musculoskeletal system and connective tissue (05/26/2018), Personal history of other diseases of the respiratory system (09/10/2021), Personal history of other diseases of the respiratory system (09/12/2021), Personal history of other diseases of the respiratory system (01/14/2020), Personal history of other diseases of the respiratory system, Personal history of other diseases of the respiratory system (05/01/2018), Personal history of other diseases of the respiratory system, Personal history of other diseases of urinary system,  Personal history of other endocrine, nutritional and metabolic disease (11/25/2014), Personal history of other endocrine, nutritional and metabolic disease (02/07/2020), Personal history of other endocrine, nutritional and metabolic disease (02/19/2020), Personal history of other specified conditions (02/07/2020), Personal history of other specified conditions (02/07/2020), Personal history of other specified conditions (09/27/2019), Personal history of other specified conditions (12/22/2015), Personal history of other specified conditions (12/22/2015), Personal history of other specified conditions (02/17/2016), Personal history of other specified conditions (08/26/2020), Personal history of other specified conditions (03/09/2022), Personal history of other specified conditions, Right lower quadrant pain (06/20/2021), and Snoring.  Past Surgical History:  has a past surgical history that includes Kidney surgery (01/02/2015).  Social History:  reports that she has never smoked. She has never used smokeless tobacco. She reports current alcohol use. She reports that she does not use drugs.  Family History: family history is not on file.. Unless otherwise noted, family history is non contributory  The patient’s home medications have been reviewed.  Allergies: Cat dander, Ciprofloxacin, and House dust        ---------------------------------------------------PHYSICAL EXAM--------------------------------------    GENERAL APPEARANCE: Awake and alert.   VITAL SIGNS: As per the nurses' triage record.  Tachycardic   HEENT: Normocephalic, atraumatic. no raccoon eyes or banks signs noted.  No rashes or sores no petechial rash noted. No contusions abrasions extraocular muscles are intact. Pupils equal round and reactive to light. Conjunctiva are pink. Negative scleral icterus. Mucous membranes are moist. Tongue in the midline. Pharynx was without erythema or exudates, uvula midline no bite to the tongue or lip.  No epistaxis  "noted.  NECK: Soft Nontender and supple, full gross ROM, no meningeal signs.  CHEST: Nontender to palpation. Clear to auscultation bilaterally. No rales, rhonchi, or wheezing.  No flail chest noted.  HEART: S1, S2. Regular rate and rhythm. No murmurs, gallops or rubs.  Strong and equal pulses in the extremities.   ABDOMEN: Soft, nontender, nondistended, positive bowel sounds, no palpable masses.  MUSCULCSKELETAL: The calves are nontender to palpation. Full gross active range of motion.   NEUROLOGICAL: Awake, alert and oriented x 3. Power intact in the upper and lower extremities. Sensation is intact to light touch in the upper and lower extremities.  NIH 0.  Test of skew negative Van negative.  IMMUNOLOGICAL: No lymphatic streaking noted   DERM: No petechiae, rashes, or ecchymoses.          ------------------------- NURSING NOTES AND VITALS REVIEWED ---------------------------  The nursing notes within the ED encounter and vital signs as below have been reviewed by myself  /87   Pulse 99   Temp 36.6 °C (97.8 °F) (Oral)   Resp 19   Ht 1.6 m (5' 3\")   Wt 80 kg (176 lb 5.9 oz)   SpO2 98%   BMI 31.24 kg/m²     Oxygen Saturation Interpretation: 97% room air    The cardiac monitor revealed tachycardia with a heart rate in the 100s as interpreted by me. The cardiac monitor was ordered secondary to the patient's heart rate and to monitor the patient for dysrhythmia.       The patient’s available past medical records and past encounters were reviewed.          -----------------------DIAGNOSTIC RESULTS------------------------  LABS:    Labs Reviewed   CBC WITH AUTO DIFFERENTIAL - Abnormal       Result Value    WBC 17.2 (*)     nRBC 0.0      RBC 4.81      Hemoglobin 14.5      Hematocrit 44.0      MCV 92      MCH 30.1      MCHC 33.0      RDW 13.2      Platelets 437      Neutrophils % 78.4      Immature Granulocytes %, Automated 0.4      Lymphocytes % 15.6      Monocytes % 4.9      Eosinophils % 0.2      Basophils % " 0.5      Neutrophils Absolute 13.46 (*)     Immature Granulocytes Absolute, Automated 0.07      Lymphocytes Absolute 2.69      Monocytes Absolute 0.85      Eosinophils Absolute 0.04      Basophils Absolute 0.09     COMPREHENSIVE METABOLIC PANEL - Abnormal    Glucose 103 (*)     Sodium 133 (*)     Potassium 4.0      Chloride 99      Bicarbonate 16 (*)     Anion Gap 22 (*)     Urea Nitrogen 12      Creatinine 0.77      eGFR >90      Calcium 9.0      Albumin 4.5      Alkaline Phosphatase 48      Total Protein 7.5      AST 28      Bilirubin, Total 0.6      ALT 24     URINALYSIS WITH REFLEX CULTURE AND MICROSCOPIC - Abnormal    Color, Urine Light-Yellow      Appearance, Urine Clear      Specific Gravity, Urine 1.014      pH, Urine 6.0      Protein, Urine NEGATIVE      Glucose, Urine Normal      Blood, Urine 0.06 (1+) (*)     Ketones, Urine 40 (2+) (*)     Bilirubin, Urine NEGATIVE      Urobilinogen, Urine Normal      Nitrite, Urine NEGATIVE      Leukocyte Esterase, Urine NEGATIVE     LACTATE - Abnormal    Lactate 7.5 (*)     Narrative:     Venipuncture immediately after or during the administration of Metamizole may lead to falsely low results. Testing should be performed immediately prior to Metamizole dosing.   URINALYSIS MICROSCOPIC WITH REFLEX CULTURE - Abnormal    WBC, Urine 1-5      RBC, Urine 1-2      Squamous Epithelial Cells, Urine 1-9 (SPARSE)      Bacteria, Urine 1+ (*)     Mucus, Urine FEW     MAGNESIUM - Normal    Magnesium 2.19     HUMAN CHORIONIC GONADOTROPIN, SERUM QUANTITATIVE - Normal    HCG, Beta-Quantitative <2      Narrative:      Total HCG measurement is performed using the [employer] Access   Immunoassay which detects intact HCG and free beta HCG subunit.    This test is not indicated for use as a tumor marker.   HCG testing is performed using a different test methodology at [facility] than other [facility]. Direct result comparison   should only be made within the same  method.       SARS-COV-2, INFLUENZA A/B AND RSV PCR - Normal    Coronavirus 2019, PCR Not Detected      Flu A Result Not Detected      Flu B Result Not Detected      RSV PCR Not Detected      Narrative:     This assay is an FDA-cleared, in vitro diagnostic nucleic acid amplification test for the qualitative detection and differentiation of SARS CoV-2/ Influenza A/B/ RSV from nasopharyngeal specimens collected from individuals with signs and symptoms of respiratory tract infections, and has been validated for use at [facility]. Negative results do not preclude COVID-19/ Influenza A/B/ RSV infections and should not be used as the sole basis for diagnosis, treatment, or other management decisions. Testing for SARS CoV-2 is recommended only for patients who meet current clinical and/or epidemiological criteria defined by federal, state, or local public health directives.   ACUTE TOXICOLOGY PANEL, BLOOD - Normal    Acetaminophen <10.0      Salicylate  <3      Alcohol <10     SERIAL TROPONIN-INITIAL - Normal    Troponin I, High Sensitivity <3      Narrative:     Less than 99th percentile of normal range cutoff-  Female and children under 18 years old <14 ng/L; Male <21 ng/L: Negative  Repeat testing should be performed if clinically indicated.     Female and children under 18 years old 14-50 ng/L; Male 21-50 ng/L:  Consistent with possible cardiac damage and possible increased clinical   risk. Serial measurements may help to assess extent of myocardial damage.     >50 ng/L: Consistent with cardiac damage, increased clinical risk and  myocardial infarction. Serial measurements may help assess extent of   myocardial damage.      NOTE: Children less than 1 year old may have higher baseline troponin   levels and results should be interpreted in conjunction with the overall   clinical context.     NOTE: Troponin I testing is performed using a different   testing methodology at [facility]  than at other   system hospitals. Direct result comparisons should only   be made within the same method.   SERIAL TROPONIN, 1 HOUR - Normal    Troponin I, High Sensitivity <3      Narrative:     Less than 99th percentile of normal range cutoff-  Female and children under 18 years old <14 ng/L; Male <21 ng/L: Negative  Repeat testing should be performed if clinically indicated.     Female and children under 18 years old 14-50 ng/L; Male 21-50 ng/L:  Consistent with possible cardiac damage and possible increased clinical   risk. Serial measurements may help to assess extent of myocardial damage.     >50 ng/L: Consistent with cardiac damage, increased clinical risk and  myocardial infarction. Serial measurements may help assess extent of   myocardial damage.      NOTE: Children less than 1 year old may have higher baseline troponin   levels and results should be interpreted in conjunction with the overall   clinical context.     NOTE: Troponin I testing is performed using a different   testing methodology at [facility] than at other   [facility]. Direct result comparisons should only   be made within the same method.   DRUG SCREEN,URINE - Normal    Amphetamine Screen, Urine Presumptive Negative      Barbiturate Screen, Urine Presumptive Negative      Benzodiazepines Screen, Urine Presumptive Negative      Cannabinoid Screen, Urine Presumptive Negative      Cocaine Metabolite Screen, Urine Presumptive Negative      Fentanyl Screen, Urine Presumptive Negative      Opiate Screen, Urine Presumptive Negative      Oxycodone Screen, Urine Presumptive Negative      PCP Screen, Urine Presumptive Negative      Methadone Screen, Urine Presumptive Negative      Narrative:     Drug screen results are presumptive and should not be used to assess   compliance with prescribed medication. Contact the performing [facility] laboratory   to add-on definitive confirmatory testing if clinically indicated.    Toxicology  screening results are reported qualitatively. The concentration must   be greater than or equal to the cutoff to be reported as positive. The concentration   at which the screening test can detect an individual drug or metabolite varies.   The absence of expected drug(s) and/or drug metabolite(s) may indicate non-compliance,   inappropriate timing of specimen collection relative to drug administration, poor drug   absorption, diluted/adulterated urine, or limitations of testing. For medical purposes   only; not valid for forensic use.    Interpretive questions should be directed to the laboratory medical directors.   LACTATE - Normal    Lactate 1.6      Narrative:     Venipuncture immediately after or during the administration of Metamizole may lead to falsely low results. Testing should be performed immediately prior to Metamizole dosing.   BLOOD CULTURE   BLOOD CULTURE   TROPONIN SERIES- (INITIAL, 1 HR)    Narrative:     The following orders were created for panel order Troponin I Series, High Sensitivity (0, 1 HR).  Procedure                               Abnormality         Status                     ---------                               -----------         ------                     Troponin I, High Sensiti...[[id number]]  Normal              Final result               Troponin, High Sensitivi...[[id number]]  Normal              Final result                 Please view results for these tests on the individual orders.   URINALYSIS WITH REFLEX CULTURE AND MICROSCOPIC    Narrative:     The following orders were created for panel order Urinalysis with Reflex Culture and Microscopic.  Procedure                               Abnormality         Status                     ---------                               -----------         ------                     Urinalysis with Reflex C...[[id number]]  Abnormal            Final result               Extra Urine Gray Tube[[id number]]                                                          Please view results for these tests on the individual orders.   EXTRA URINE GRAY TUBE   PROLACTIN       As interpreted by me, the above displayed labs are abnormal. All other labs obtained during this visit were within normal range or not returned as of this dictation.      EKG Interpretation    1228 I independently interpreted the results of the EKG and it showed sinus tachycardia 112 bpm, normal axis, normal voltage, normal ST segment, and a borderline prolongation of the QT interval, normal T waves.  I was not otherwise involved in the care of this patient. [NG]           CT head wo IV contrast   Final Result   1. No evidence of acute cortical infarct or intracranial hemorrhage.   2. No evidence of intracranial hemorrhage or displaced skull fracture.             MACRO:   None        Signed by: Wendi Sweeney 4/27/2025 4:17 PM   Dictation workstation:   [id number]      XR chest 2 views   Final Result   No radiographic evidence of acute cardiopulmonary disease.   Signed by Chato Mendoza MD              CT head wo IV contrast   Final Result   1. No evidence of acute cortical infarct or intracranial hemorrhage.   2. No evidence of intracranial hemorrhage or displaced skull fracture.             MACRO:   None        Signed by: Wendi Sweeney 4/27/2025 4:17 PM   Dictation workstation:   [id number]      XR chest 2 views   Final Result   No radiographic evidence of acute cardiopulmonary disease.   Signed by Chato Mendoza MD              ------------------------------ ED COURSE/MEDICAL DECISION MAKING----------------------  Medical Decision Making:   Exam: A medically appropriate exam performed, outlined above, given the known history and presentation.    History obtained from: Review of medical record nursing notes patient      Social Determinants of Health considered during this visit: Takes care of her self at home lives with family      PAST MEDICAL HISTORY/Chronic Conditions Affecting Care     has a  past medical history of Abnormal coagulation profile (12/31/2015), Abnormal uterine and vaginal bleeding, unspecified (07/20/2015), Acute candidiasis of vulva and vagina (02/22/2016), Acute pharyngitis, unspecified (01/14/2020), Acute upper respiratory infection, unspecified (12/26/2021), Burn of mouth and pharynx, initial encounter (02/27/2019), Contact with and (suspected) exposure to covid-19 (12/26/2021), Encounter for removal of intrauterine contraceptive device (07/20/2015), Encounter for screening for infections with a predominantly sexual mode of transmission (02/22/2016), Encounter for screening for infections with a predominantly sexual mode of transmission (05/15/2017), Epigastric pain (11/25/2015), Inflammatory conditions of jaws (03/04/2019), Influenza due to other identified influenza virus with other respiratory manifestations (01/31/2020), Other conditions influencing health status (02/22/2016), Other conditions influencing health status (11/20/2015), Other conditions influencing health status (05/14/2019), Other conditions influencing health status (09/12/2021), Other disturbances of smell and taste (03/26/2019), Other general symptoms and signs (01/14/2020), Other specified abnormal findings of blood chemistry (02/19/2020), Other specified anxiety disorders (06/07/2021), Other specified diseases of pancreas (05/26/2018), Other symptoms and signs involving general sensations and perceptions (12/03/2020), Pain in throat (09/12/2021), Personal history of other diseases of the digestive system (02/17/2016), Personal history of other diseases of the digestive system (01/07/2016), Personal history of other diseases of the female genital tract (12/08/2020), Personal history of other diseases of the musculoskeletal system and connective tissue (02/07/2020), Personal history of other diseases of the musculoskeletal system and connective tissue (09/27/2019), Personal history of other diseases of the  musculoskeletal system and connective tissue (05/26/2018), Personal history of other diseases of the respiratory system (09/10/2021), Personal history of other diseases of the respiratory system (09/12/2021), Personal history of other diseases of the respiratory system (01/14/2020), Personal history of other diseases of the respiratory system, Personal history of other diseases of the respiratory system (05/01/2018), Personal history of other diseases of the respiratory system, Personal history of other diseases of urinary system, Personal history of other endocrine, nutritional and metabolic disease (11/25/2014), Personal history of other endocrine, nutritional and metabolic disease (02/07/2020), Personal history of other endocrine, nutritional and metabolic disease (02/19/2020), Personal history of other specified conditions (02/07/2020), Personal history of other specified conditions (02/07/2020), Personal history of other specified conditions (09/27/2019), Personal history of other specified conditions (12/22/2015), Personal history of other specified conditions (12/22/2015), Personal history of other specified conditions (02/17/2016), Personal history of other specified conditions (08/26/2020), Personal history of other specified conditions (03/09/2022), Personal history of other specified conditions, Right lower quadrant pain (06/20/2021), and Snoring.       CC/HPI Summary, Social Determinants of health, Records Reviewed, DDx, testing done/not done, ED Course, Reassessment, disposition considerations/shared decision making with patient, consults, disposition:   Presents with choking episode followed by altered mental status reported CPR started at home by family  Plan  Chest x-ray, EKG, CBC, CMP, pregnancy, magnesium, COVID flu RSV, troponin, tox screen, urine NIH 0.  Test of skew negative Van negative      Medical Decision Making/Differential Diagnosis:  Differentials include limited to choking episode versus  dehydration versus electrolyte abnormality versus anemia versus intoxication versus seizure  Review  Drug screen negative  Alcohol negative  COVID flu RSV negative  White blood cell count 17.2 likely reactive  Glucose 103  Sodium slightly low otherwise electrolytes unremarkable with electrolyte imbalance noted bicarb of 16 anion gap of 22  Normal renal function  Normal LFTs  Hemoglobin 14.5  Urine showed trace blood ketones otherwise unremarkable  Magnesium normal  Pregnancy negative  Troponin less than 3  Lactate initially 7.5 repeat lactate 1.6 after fluids patient presented to the emergency department with what sounds like seizure-like activity.   heard a sound  When it and she was making a choking sensation reported he started to CPR patient went unresponsive and then woke up confused.  Upon arrival is alert and oriented.  Calm cooperative NIH 0.  Test of skew negative Van negative cannot recall what happened today.  States she was out all night drinking.  Family reports she has not eaten or drank anything since yesterday early morning.  Drug screen negative alcohol negative.  COVID flu RSV negative.  Sodium 133 otherwise electrolytes unremarkable with electrolyte imbalance noted.  Anion gap of 22 bicarb of 16.  Normal renal function.  Normal LFTs.  Pregnancy negative.  Urine showed ketones trace blood +1 bacteria otherwise unremarkable sent for culture no urinary symptoms.  Chest x-ray showedNo radiographic evidence of acute cardiopulmonary disease.  CT of the head showed\   1. No evidence of acute cortical infarct or intracranial hemorrhage.  2. No evidence of intracranial hemorrhage or displaced skull fracture  Patient states she is almost back to her baseline she feels somewhat tired and nauseated was given Zofran p.o. challenge tolerated well.  Ambulates with no difficulty.  Moving all extremities per  normal.  Discussed case with neurology team suspect this is more of seizure-like activity.  Patient  advised no driving referral to neurology with scheduled appointment.  Advised supportive care measures at home drink plenty of fluids rest.  No further seizure-like activity in the emergency department.  Patient was initially tachycardic leukocytosis lactate was elevated she does not appear to be septic or toxic suspect this is secondary to reactive from seizure-like activity with no sleep and alcohol ingestion.  Patient and family amenable plan amenable to discharge CMT for discharge utilized discharge planning heart rate has improved patient is alert and oriented ambulated vital signs improved upon discharge.  Patient did receive fluids.  Based on her clinical presentation history and symptoms consistent with  Seizure-like activity  Lactic acidosis resolved  Leukocytosis likely reactive  Nausea Zofran diet modifications  Close follow-up with primary care physician and neurology return with any worsening symptoms or concerns patient seen evaluated with attending physician Dr. Zepeda     Unless otherwise noted below, none      CONSULTS:   None      ED Course as of 04/27/25 1706   Sun Apr 27, 2025   1228 I independently interpreted the results of the EKG and it showed sinus tachycardia 112 bpm, normal axis, normal voltage, normal ST segment, and a borderline prolongation of the QT interval, normal T waves.  I was not otherwise involved in the care of this patient. [NG]   1442 WBC(!): 17.2  Patient does not appear to be septic or toxic.  Her lactate is elevated suspicious of seizure.  Lactate improved with IV fluids.  Patient is back to baseline. [TB]   1444 Discussed case with neurology team Dr. Hernández   After review of patient's laboratory data test results and symptoms suspect that she may have had a seizure.  Advised patient no driving follow-up with neurology team for outpatient workup of seizures okay to discharge as long as CT is negative and patient does not experience a second seizure and she is back to  baseline [TB]      ED Course User Index  [NG] Pham Bergeron MD  [TB] SUSAN Hassan         Diagnoses as of 04/27/25 1706   Leukocytosis, unspecified type   Lactic acidosis   Seizure-like activity (Multi)   Nausea         This patient has remained hemodynamically stable during their ED course.      Critical Care: None      Counseling:  The emergency provider has spoken with the patient family and discussed today’s results, in addition to providing specific details for the plan of care and counseling regarding the diagnosis and prognosis.  Questions are answered at this time and they are agreeable with the plan.         --------------------------------- IMPRESSION AND DISPOSITION ---------------------------------    IMPRESSION  1. Seizure-like activity (Multi)    2. Leukocytosis, unspecified type    3. Lactic acidosis    4. Nausea        DISPOSITION  Disposition: Discharge home   Patient condition is stable improved        NOTE: This report was transcribed using voice recognition software. Every effort was made to ensure accuracy; however, inadvertent computerized transcription errors may be present      SUSAN Hassan  04/27/25 1706

## 2025-04-27 NOTE — ED PROVIDER NOTES
Supervisory note:  Patient seen in conjunction with Bridgette Rivera NP.  Patient presents after episode in which she was unresponsive.  Her  states that he heard a strange noise/strange breathing and then she began to produce green sputum.  Her  reportedly got her on the floor and started CPR.  Patient does not remember of this episode and does not remember anything until she was in the ambulance.  She gradually returned to normal mentation.  Nothing like this has happened previously.  Patient does endorse being out all night drinking alcohol.  She does have history of cystic fibrosis.  On examination, patient is awake and alert, answers questions appropriately.  Vital signs reveal heart rate which is still mildly elevated but is otherwise unremarkable.    Laboratory studies significant for initially elevated lactic acid level but normalized on recheck.  Chest x-ray without acute findings.  Initial bicarb slightly low, with anion gap, but felt to be secondary to lactic acidosis.  Patient's case was discussed with neurology.  They have agreed to follow-up as an outpatient.  Head CT is unremarkable.  Patient given seizure precautions including no driving for 6 months and was advised to follow-up with primary care and neurology.  Return precautions given for any additional seizures or worsening symptoms.    I personally saw the patient and made/approved the management plan and take responsiblity for the patient management.  Parts of this chart were completed with dictation software, please excuse any errors in transcription.     Alexandro Zepeda MD  04/27/25 0747

## 2025-04-27 NOTE — DISCHARGE INSTRUCTIONS
No driving or operating machinery until follow-up with primary care physician/neurology  Return with any worsening symptoms or seizure-like activity  Follow-up with neurology  Diet modification for nausea vomiting.  Zofran  Avoid any further alcohol use

## 2025-04-28 ENCOUNTER — TELEPHONE (OUTPATIENT)
Dept: PEDIATRIC PULMONOLOGY | Facility: HOSPITAL | Age: 30
End: 2025-04-28
Payer: COMMERCIAL

## 2025-04-28 LAB — PROLACTIN SERPL-MCNC: 8.7 UG/L (ref 3–20)

## 2025-04-28 PROCEDURE — RXMED WILLOW AMBULATORY MEDICATION CHARGE

## 2025-04-28 NOTE — TELEPHONE ENCOUNTER
RN reached out to pt to inquire how she is feeling since going to the ER for a seizure.  Pt stated she tired and plans to take the next couple days off from work to rest. RN discussed with pt the message from Dr. Marie regarding the kidney US. Pt's plan is to rest and will get back to us about moving forward with the kidney US.

## 2025-04-28 NOTE — TELEPHONE ENCOUNTER
----- Message from Mary Marie sent at 4/26/2025  5:08 PM EDT -----  Regarding: Calcium Oxalate  Greggbrianne Venessa Summers had a lot of calcium oxalate crystals in her urine and has a history of kidney stones.  The recent liver ultrasound got a look at her right kidney, but not the left.  I think it might be wise to get an ultrasound of the left kidney (where she had stones in 2012) and make sure there isn't something there.  I asked her to let me know.  I'm not sure if she'll send a message or call.  If she agrees, we can order an ultrasound of her kidneys.  Thank you.

## 2025-04-29 ENCOUNTER — PHARMACY VISIT (OUTPATIENT)
Dept: PHARMACY | Facility: CLINIC | Age: 30
End: 2025-04-29
Payer: COMMERCIAL

## 2025-04-30 ENCOUNTER — PATIENT OUTREACH (OUTPATIENT)
Dept: CARE COORDINATION | Facility: CLINIC | Age: 30
End: 2025-04-30
Payer: COMMERCIAL

## 2025-04-30 LAB
ATRIAL RATE: 112 BPM
P AXIS: 10 DEGREES
P OFFSET: 198 MS
P ONSET: 137 MS
PR INTERVAL: 156 MS
Q ONSET: 215 MS
QRS COUNT: 19 BEATS
QRS DURATION: 90 MS
QT INTERVAL: 380 MS
QTC CALCULATION(BAZETT): 518 MS
QTC FREDERICIA: 468 MS
R AXIS: 75 DEGREES
T AXIS: 12 DEGREES
T OFFSET: 405 MS
VENTRICULAR RATE: 112 BPM

## 2025-04-30 NOTE — PROGRESS NOTES
Reviewed chart prior to patient outreach. Outreach call to patient to support a smooth transition of care from recent ED visit.    Specialist Appointment Date: 7/2/25 neurology    Spoke with patient, reviewed discharge medications, discharge instructions, assessed social needs, and provided education on importance of follow-up appointment with provider.     See  Hospital Encounter and Summary, and Discharge assessment below for further details. Information obtained from discharge summary from EMR.      ED Provider Notes  Alexandro Zepeda MD (Physician)  Emergency Medicine  Supervisory note:  Patient seen in conjunction with Bridgette Rivera NP.  Patient presents after episode in which she was unresponsive.  Her  states that he heard a strange noise/strange breathing and then she began to produce green sputum.  Her  reportedly got her on the floor and started CPR.  Patient does not remember of this episode and does not remember anything until she was in the ambulance.  She gradually returned to normal mentation.  Nothing like this has happened previously.  Patient does endorse being out all night drinking alcohol.  She does have history of cystic fibrosis.  On examination, patient is awake and alert, answers questions appropriately.  Vital signs reveal heart rate which is still mildly elevated but is otherwise unremarkable.     Laboratory studies significant for initially elevated lactic acid level but normalized on recheck.  Chest x-ray without acute findings.  Initial bicarb slightly low, with anion gap, but felt to be secondary to lactic acidosis.  Patient's case was discussed with neurology.  They have agreed to follow-up as an outpatient.  Head CT is unremarkable.  Patient given seizure precautions including no driving for 6 months and was advised to follow-up with primary care and neurology.  Return precautions given for any additional seizures or worsening symptoms.    CM Outreach:  Patient denies  further seizures. Patient reports she has been in contact with her PCP. She is scheduled with neurology for 7/2/25. CM reached out to scheduling confirmed that she is on the wait list for an earlier appointment, and that neurology does not have earlier available appointments; neurology is currently scheduling into October/November. Provided patient with my contact information for potential needs.    Josseline Krause RN BSN  [facility] Care Manager  [phone]

## 2025-05-01 LAB
BACTERIA BLD CULT: NORMAL
BACTERIA BLD CULT: NORMAL

## 2025-05-05 DIAGNOSIS — R82.998 CALCIUM OXALATE CRYSTALS IN URINE: Primary | ICD-10-CM

## 2025-05-06 ENCOUNTER — APPOINTMENT (OUTPATIENT)
Dept: VASCULAR MEDICINE | Facility: CLINIC | Age: 30
End: 2025-05-06
Payer: COMMERCIAL

## 2025-05-06 ENCOUNTER — HOSPITAL ENCOUNTER (OUTPATIENT)
Dept: RADIOLOGY | Facility: HOSPITAL | Age: 30
Discharge: HOME | End: 2025-05-06
Payer: COMMERCIAL

## 2025-05-06 ENCOUNTER — TELEMEDICINE (OUTPATIENT)
Dept: NEUROLOGY | Facility: CLINIC | Age: 30
End: 2025-05-06
Payer: COMMERCIAL

## 2025-05-06 DIAGNOSIS — R82.998 CALCIUM OXALATE CRYSTALS IN URINE: ICD-10-CM

## 2025-05-06 DIAGNOSIS — R56.9 SEIZURE (MULTI): Primary | ICD-10-CM

## 2025-05-06 PROCEDURE — 76770 US EXAM ABDO BACK WALL COMP: CPT

## 2025-05-06 PROCEDURE — 99215 OFFICE O/P EST HI 40 MIN: CPT | Performed by: STUDENT IN AN ORGANIZED HEALTH CARE EDUCATION/TRAINING PROGRAM

## 2025-05-06 PROCEDURE — 76770 US EXAM ABDO BACK WALL COMP: CPT | Performed by: RADIOLOGY

## 2025-05-06 NOTE — PATIENT INSTRUCTIONS
You have been seen today in Epilepsy clinic for your seizure. As we discussed:    - Routine EEG ordered to assess for abnormal electrical activity; consideration for further testing     - MRI scan of your brain, to try and better understand the reason for your seizure and evaluate risk of recurrence.    - As we discussed, you must not drive, use heavy machinery, climb heights, or engage in any other activity that would cause harm/death to yourself or others were you to lose awareness/consciousness and have a seizure. These restrictions should stay in place until at least 6 months of seizure freedom and clearance from physician. Alcohol and sleep deprivation may provoke seizures. It is best to avoid alcohol and to get sufficient sleep each night.    - If you have any questions, please call my office [phone]. If you have any sudden new concerning or worsening symptoms, call 911 and go to the Emergency Room. Otherwise, it was good seeing you today, and we will see you back in 6 months
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
## 2025-05-09 ENCOUNTER — APPOINTMENT (OUTPATIENT)
Dept: RADIOLOGY | Facility: CLINIC | Age: 30
End: 2025-05-09
Payer: COMMERCIAL

## 2025-05-09 ENCOUNTER — HOSPITAL ENCOUNTER (OUTPATIENT)
Dept: NEUROLOGY | Facility: HOSPITAL | Age: 30
Discharge: HOME | End: 2025-05-09
Payer: COMMERCIAL

## 2025-05-09 DIAGNOSIS — R56.9 SEIZURE (MULTI): ICD-10-CM

## 2025-05-09 PROCEDURE — 95816 EEG AWAKE AND DROWSY: CPT | Performed by: PSYCHIATRY & NEUROLOGY

## 2025-05-09 PROCEDURE — 95816 EEG AWAKE AND DROWSY: CPT

## 2025-05-21 ENCOUNTER — HOSPITAL ENCOUNTER (OUTPATIENT)
Dept: RADIOLOGY | Facility: HOSPITAL | Age: 30
Discharge: HOME | End: 2025-05-21
Payer: COMMERCIAL

## 2025-05-21 DIAGNOSIS — R56.9 SEIZURE-LIKE ACTIVITY (MULTI): Primary | ICD-10-CM

## 2025-05-21 DIAGNOSIS — R56.9 SEIZURE (MULTI): ICD-10-CM

## 2025-05-21 PROCEDURE — 70553 MRI BRAIN STEM W/O & W/DYE: CPT | Performed by: RADIOLOGY

## 2025-05-21 PROCEDURE — 70553 MRI BRAIN STEM W/O & W/DYE: CPT

## 2025-05-21 PROCEDURE — 2550000001 HC RX 255 CONTRASTS: Performed by: STUDENT IN AN ORGANIZED HEALTH CARE EDUCATION/TRAINING PROGRAM

## 2025-05-21 PROCEDURE — A9575 INJ GADOTERATE MEGLUMI 0.1ML: HCPCS | Performed by: STUDENT IN AN ORGANIZED HEALTH CARE EDUCATION/TRAINING PROGRAM

## 2025-05-21 RX ORDER — GADOTERATE MEGLUMINE 376.9 MG/ML
16 INJECTION INTRAVENOUS
Status: COMPLETED | OUTPATIENT
Start: 2025-05-21 | End: 2025-05-21

## 2025-05-21 RX ADMIN — GADOTERATE MEGLUMINE 16 ML: 376.9 INJECTION INTRAVENOUS at 10:41

## 2025-05-23 PROCEDURE — RXMED WILLOW AMBULATORY MEDICATION CHARGE

## 2025-05-28 ENCOUNTER — PHARMACY VISIT (OUTPATIENT)
Dept: PHARMACY | Facility: CLINIC | Age: 30
End: 2025-05-28
Payer: COMMERCIAL

## 2025-06-11 ENCOUNTER — APPOINTMENT (OUTPATIENT)
Dept: RADIOLOGY | Facility: HOSPITAL | Age: 30
End: 2025-06-11
Payer: COMMERCIAL

## 2025-06-11 ENCOUNTER — APPOINTMENT (OUTPATIENT)
Dept: CARDIOLOGY | Facility: HOSPITAL | Age: 30
End: 2025-06-11
Payer: COMMERCIAL

## 2025-06-11 ENCOUNTER — HOSPITAL ENCOUNTER (EMERGENCY)
Facility: HOSPITAL | Age: 30
Discharge: HOME | End: 2025-06-11
Payer: COMMERCIAL

## 2025-06-11 VITALS
RESPIRATION RATE: 15 BRPM | HEART RATE: 96 BPM | DIASTOLIC BLOOD PRESSURE: 92 MMHG | HEIGHT: 63 IN | OXYGEN SATURATION: 96 % | TEMPERATURE: 97.8 F | WEIGHT: 170 LBS | SYSTOLIC BLOOD PRESSURE: 135 MMHG | BODY MASS INDEX: 30.12 KG/M2

## 2025-06-11 DIAGNOSIS — R56.9 SEIZURE-LIKE ACTIVITY (MULTI): Primary | ICD-10-CM

## 2025-06-11 DIAGNOSIS — R56.9 SEIZURE (MULTI): ICD-10-CM

## 2025-06-11 LAB
ALBUMIN SERPL BCP-MCNC: 4.1 G/DL (ref 3.4–5)
ALP SERPL-CCNC: 52 U/L (ref 33–110)
ALT SERPL W P-5'-P-CCNC: 17 U/L (ref 7–45)
ANION GAP SERPL CALCULATED.3IONS-SCNC: 17 MMOL/L (ref 10–20)
APPEARANCE UR: CLEAR
AST SERPL W P-5'-P-CCNC: 18 U/L (ref 9–39)
ATRIAL RATE: 111 BPM
BASOPHILS # BLD AUTO: 0.06 X10*3/UL (ref 0–0.1)
BASOPHILS NFR BLD AUTO: 0.5 %
BILIRUB SERPL-MCNC: 0.4 MG/DL (ref 0–1.2)
BILIRUB UR STRIP.AUTO-MCNC: NEGATIVE MG/DL
BUN SERPL-MCNC: 19 MG/DL (ref 6–23)
CALCIUM SERPL-MCNC: 8.6 MG/DL (ref 8.6–10.3)
CHLORIDE SERPL-SCNC: 104 MMOL/L (ref 98–107)
CO2 SERPL-SCNC: 19 MMOL/L (ref 21–32)
COLOR UR: NORMAL
CREAT SERPL-MCNC: 0.61 MG/DL (ref 0.5–1.05)
EGFRCR SERPLBLD CKD-EPI 2021: >90 ML/MIN/1.73M*2
EOSINOPHIL # BLD AUTO: 0.16 X10*3/UL (ref 0–0.7)
EOSINOPHIL NFR BLD AUTO: 1.3 %
ERYTHROCYTE [DISTWIDTH] IN BLOOD BY AUTOMATED COUNT: 13 % (ref 11.5–14.5)
FLUAV RNA RESP QL NAA+PROBE: NOT DETECTED
FLUBV RNA RESP QL NAA+PROBE: NOT DETECTED
GLUCOSE SERPL-MCNC: 114 MG/DL (ref 74–99)
GLUCOSE UR STRIP.AUTO-MCNC: NORMAL MG/DL
HCG UR QL IA.RAPID: NEGATIVE
HCT VFR BLD AUTO: 40.3 % (ref 36–46)
HGB BLD-MCNC: 13.3 G/DL (ref 12–16)
IMM GRANULOCYTES # BLD AUTO: 0.04 X10*3/UL (ref 0–0.7)
IMM GRANULOCYTES NFR BLD AUTO: 0.3 % (ref 0–0.9)
KETONES UR STRIP.AUTO-MCNC: NEGATIVE MG/DL
LACTATE SERPL-SCNC: 2.2 MMOL/L (ref 0.4–2)
LACTATE SERPL-SCNC: 4.7 MMOL/L (ref 0.4–2)
LEUKOCYTE ESTERASE UR QL STRIP.AUTO: NEGATIVE
LYMPHOCYTES # BLD AUTO: 4.47 X10*3/UL (ref 1.2–4.8)
LYMPHOCYTES NFR BLD AUTO: 36.4 %
MCH RBC QN AUTO: 30.7 PG (ref 26–34)
MCHC RBC AUTO-ENTMCNC: 33 G/DL (ref 32–36)
MCV RBC AUTO: 93 FL (ref 80–100)
MONOCYTES # BLD AUTO: 0.87 X10*3/UL (ref 0.1–1)
MONOCYTES NFR BLD AUTO: 7.1 %
NEUTROPHILS # BLD AUTO: 6.69 X10*3/UL (ref 1.2–7.7)
NEUTROPHILS NFR BLD AUTO: 54.4 %
NITRITE UR QL STRIP.AUTO: NEGATIVE
NRBC BLD-RTO: 0 /100 WBCS (ref 0–0)
P AXIS: 39 DEGREES
P OFFSET: 200 MS
P ONSET: 147 MS
PH UR STRIP.AUTO: 6 [PH]
PLATELET # BLD AUTO: 384 X10*3/UL (ref 150–450)
POTASSIUM SERPL-SCNC: 3.8 MMOL/L (ref 3.5–5.3)
PR INTERVAL: 138 MS
PROLACTIN SERPL-MCNC: 37.4 UG/L (ref 3–20)
PROT SERPL-MCNC: 6.7 G/DL (ref 6.4–8.2)
PROT UR STRIP.AUTO-MCNC: NEGATIVE MG/DL
Q ONSET: 216 MS
QRS COUNT: 18 BEATS
QRS DURATION: 86 MS
QT INTERVAL: 350 MS
QTC CALCULATION(BAZETT): 476 MS
QTC FREDERICIA: 429 MS
R AXIS: 49 DEGREES
RBC # BLD AUTO: 4.33 X10*6/UL (ref 4–5.2)
RBC # UR STRIP.AUTO: NEGATIVE MG/DL
RSV RNA RESP QL NAA+PROBE: NOT DETECTED
SARS-COV-2 RNA RESP QL NAA+PROBE: NOT DETECTED
SODIUM SERPL-SCNC: 136 MMOL/L (ref 136–145)
SP GR UR STRIP.AUTO: 1.01
T AXIS: 25 DEGREES
T OFFSET: 391 MS
UROBILINOGEN UR STRIP.AUTO-MCNC: NORMAL MG/DL
VENTRICULAR RATE: 111 BPM
WBC # BLD AUTO: 12.3 X10*3/UL (ref 4.4–11.3)

## 2025-06-11 PROCEDURE — 36415 COLL VENOUS BLD VENIPUNCTURE: CPT

## 2025-06-11 PROCEDURE — 2500000001 HC RX 250 WO HCPCS SELF ADMINISTERED DRUGS (ALT 637 FOR MEDICARE OP)

## 2025-06-11 PROCEDURE — 87637 SARSCOV2&INF A&B&RSV AMP PRB: CPT

## 2025-06-11 PROCEDURE — 94640 AIRWAY INHALATION TREATMENT: CPT

## 2025-06-11 PROCEDURE — 71045 X-RAY EXAM CHEST 1 VIEW: CPT

## 2025-06-11 PROCEDURE — 85025 COMPLETE CBC W/AUTO DIFF WBC: CPT

## 2025-06-11 PROCEDURE — 71045 X-RAY EXAM CHEST 1 VIEW: CPT | Mod: FOREIGN READ | Performed by: RADIOLOGY

## 2025-06-11 PROCEDURE — 93005 ELECTROCARDIOGRAM TRACING: CPT

## 2025-06-11 PROCEDURE — 81025 URINE PREGNANCY TEST: CPT

## 2025-06-11 PROCEDURE — 96361 HYDRATE IV INFUSION ADD-ON: CPT

## 2025-06-11 PROCEDURE — 70450 CT HEAD/BRAIN W/O DYE: CPT

## 2025-06-11 PROCEDURE — 2500000004 HC RX 250 GENERAL PHARMACY W/ HCPCS (ALT 636 FOR OP/ED)

## 2025-06-11 PROCEDURE — 99285 EMERGENCY DEPT VISIT HI MDM: CPT | Mod: 25

## 2025-06-11 PROCEDURE — 83605 ASSAY OF LACTIC ACID: CPT

## 2025-06-11 PROCEDURE — 81003 URINALYSIS AUTO W/O SCOPE: CPT

## 2025-06-11 PROCEDURE — 80053 COMPREHEN METABOLIC PANEL: CPT

## 2025-06-11 PROCEDURE — 96365 THER/PROPH/DIAG IV INF INIT: CPT

## 2025-06-11 PROCEDURE — 84146 ASSAY OF PROLACTIN: CPT | Mod: TRILAB

## 2025-06-11 PROCEDURE — 70450 CT HEAD/BRAIN W/O DYE: CPT | Performed by: RADIOLOGY

## 2025-06-11 PROCEDURE — 2500000002 HC RX 250 W HCPCS SELF ADMINISTERED DRUGS (ALT 637 FOR MEDICARE OP, ALT 636 FOR OP/ED)

## 2025-06-11 RX ORDER — LEVETIRACETAM 500 MG/1
500 TABLET ORAL 2 TIMES DAILY
Qty: 60 TABLET | Refills: 2 | Status: SHIPPED | OUTPATIENT
Start: 2025-06-11 | End: 2025-09-09

## 2025-06-11 RX ORDER — IPRATROPIUM BROMIDE AND ALBUTEROL SULFATE 2.5; .5 MG/3ML; MG/3ML
3 SOLUTION RESPIRATORY (INHALATION) ONCE
Status: COMPLETED | OUTPATIENT
Start: 2025-06-11 | End: 2025-06-11

## 2025-06-11 RX ORDER — LEVETIRACETAM 5 MG/ML
500 INJECTION INTRAVASCULAR ONCE
Status: COMPLETED | OUTPATIENT
Start: 2025-06-11 | End: 2025-06-11

## 2025-06-11 RX ORDER — ONDANSETRON HYDROCHLORIDE 2 MG/ML
4 INJECTION, SOLUTION INTRAVENOUS ONCE
Status: DISCONTINUED | OUTPATIENT
Start: 2025-06-11 | End: 2025-06-11 | Stop reason: HOSPADM

## 2025-06-11 RX ORDER — IBUPROFEN 600 MG/1
600 TABLET, FILM COATED ORAL ONCE
Status: COMPLETED | OUTPATIENT
Start: 2025-06-11 | End: 2025-06-11

## 2025-06-11 RX ADMIN — IPRATROPIUM BROMIDE AND ALBUTEROL SULFATE 3 ML: 2.5; .5 SOLUTION RESPIRATORY (INHALATION) at 01:24

## 2025-06-11 RX ADMIN — SODIUM CHLORIDE 1000 ML: 9 INJECTION, SOLUTION INTRAVENOUS at 00:52

## 2025-06-11 RX ADMIN — LEVETIRACETAM 500 MG: 5 INJECTION INTRAVENOUS at 03:57

## 2025-06-11 RX ADMIN — IBUPROFEN 600 MG: 600 TABLET ORAL at 04:01

## 2025-06-11 ASSESSMENT — PAIN - FUNCTIONAL ASSESSMENT: PAIN_FUNCTIONAL_ASSESSMENT: 0-10

## 2025-06-11 ASSESSMENT — PAIN SCALES - GENERAL: PAINLEVEL_OUTOF10: 0 - NO PAIN

## 2025-06-11 NOTE — Clinical Note
Venessa Duran was seen and treated in our emergency department on 6/11/2025.  She may return to work on 06/12/2025.       If you have any questions or concerns, please don't hesitate to call.      Matias Nieto, DO

## 2025-06-11 NOTE — DISCHARGE INSTRUCTIONS
You are seen today for a seizure, imaging and lab work were reassuring, like we discussed, I will give you a antiseizure medicine called Keppra, also known as levetiracetam.  He will take it twice a day.  I also provided you with a medicine called intranasal Versed/midazolam.  This should be used if you have a seizure that lasts greater than 5 minutes, please follow-up with your neurology team, please return to the ER for any worsening symptoms.    Thank you for choosing [facility] Emergency Department. It was my pleasure to be involved in your care today.         As of today's visit, based on reasonable likelihood, that it is safe for you to be discharged back to your residence to follow-up as an outpatient for ongoing management of your medical problem. You should follow-up with any referrals / primary provider as soon as possible. The contacts (number, addresses) are listed below.         Important:  Even though we think it is safe for you to go home, there is always a small chance that we are missing something that could require hospitalization.  Therefore it is very important that if you get worse or develops any new symptoms that you return here as soon as possible to be re-evaluated.  This includes return of symptoms that have resolved such as fainting, chest pain, or symptoms that could be warning signs for stroke important:  Even though we think it is safe for you to go home, there is always a small chance that we are missing something that could require hospitalization.  Therefore it is very important that if you get worse or develops any new symptoms that you return here as soon as possible to be re-evaluated.  This includes return of symptoms that have resolved such as fainting, chest pain, or symptoms that could be warning signs for stroke         Make sure your pharmacy and primary doctor is aware of any new medications prescribed today.          It is your responsibility to contact as soon as  possible, and follow through with, any referrals you were given today. We do recommend you inform them you are a [facility] ER follow-up patient, as often they can better accommodate your need to be seen, provided their schedules allow. We will, and have, made every effort to ensure you have access to adequate follow-up specialists available.          All problems may not be able to be fixed in one ER visit. This is why timely ongoing care is important, and this is a responsibility you share in. Further, you are free to follow up with any provider you choose, and this is not limited to our suggestion.          If cultures were obtained today, you will be contacted should anything result that would require further treatment. Please contact the ED at the number provided with questions.          Having trouble affording medications? Try [email or web address]! (This is not a hospital endorsed website, merely a recommendation based on my own personal experiences with [facility])
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
## 2025-06-18 PROCEDURE — RXMED WILLOW AMBULATORY MEDICATION CHARGE

## 2025-06-23 ENCOUNTER — PHARMACY VISIT (OUTPATIENT)
Dept: PHARMACY | Facility: CLINIC | Age: 30
End: 2025-06-23
Payer: COMMERCIAL

## 2025-06-27 PROCEDURE — RXMED WILLOW AMBULATORY MEDICATION CHARGE

## 2025-07-01 ENCOUNTER — PHARMACY VISIT (OUTPATIENT)
Dept: PHARMACY | Facility: CLINIC | Age: 30
End: 2025-07-01
Payer: COMMERCIAL

## 2025-07-02 ENCOUNTER — APPOINTMENT (OUTPATIENT)
Dept: NEUROLOGY | Facility: HOSPITAL | Age: 30
End: 2025-07-02
Payer: COMMERCIAL

## 2025-07-10 DIAGNOSIS — R56.9 SEIZURE (MULTI): Primary | ICD-10-CM

## 2025-07-12 ENCOUNTER — APPOINTMENT (OUTPATIENT)
Dept: RADIOLOGY | Facility: HOSPITAL | Age: 30
End: 2025-07-12
Payer: COMMERCIAL

## 2025-07-17 PROCEDURE — RXMED WILLOW AMBULATORY MEDICATION CHARGE

## 2025-07-18 ENCOUNTER — PHARMACY VISIT (OUTPATIENT)
Dept: PHARMACY | Facility: CLINIC | Age: 30
End: 2025-07-18
Payer: COMMERCIAL

## 2025-07-18 ENCOUNTER — TELEMEDICINE (OUTPATIENT)
Dept: NEUROLOGY | Facility: HOSPITAL | Age: 30
End: 2025-07-18
Payer: COMMERCIAL

## 2025-07-18 DIAGNOSIS — R56.9 SEIZURE (MULTI): Primary | ICD-10-CM

## 2025-07-18 PROCEDURE — 1036F TOBACCO NON-USER: CPT | Performed by: NURSE PRACTITIONER

## 2025-07-18 PROCEDURE — 98015 SYNCH AUDIO-ONLY EST HIGH 40: CPT | Performed by: NURSE PRACTITIONER

## 2025-07-18 RX ORDER — LEVETIRACETAM 500 MG/1
500 TABLET ORAL 2 TIMES DAILY
Qty: 60 TABLET | Refills: 11 | Status: SHIPPED | OUTPATIENT
Start: 2025-07-18 | End: 2026-07-18

## 2025-07-18 NOTE — PATIENT INSTRUCTIONS
"Thank you for coming to the [facility] today.  -If you have any sudden new, concerning or worsening symptoms, call 911 and go to the Emergency Room. Otherwise, it was good seeing you today-    -Please follow seizure precautions:   Please do not drive, operate any heavy machinery, swim unsupervised, please shower without collection of water instead of bathe. Be cautious around hot, heavy, or sharp objects. Do not cook with an open flame and do not perform any activities at heights such as on a ladder. These precautions should stay in place until 6 months seizure free and cleared by a provider.    -HOW TO CONTACT [facility] NURSE PRACTITIONER ([phone]).   Instructed to call in the event of seizure, medication refills, or any questions  *Please allow 24-48 hours for non-urgent responses*.  For emergency concerns, please dial 911 or present to the nearest emergency room.  For concerns after business hours (8am-4:30pm) or on weekends please call [phone]  To call and schedule a follow up appointment please call [phone]  -Paperwork may take up to 3 business days to complete-    Every attempt is made to run on time for your appointment, if you are 15 minutes or later for your appoinement you may be asked to reschedule    -Compliance education: It is important to continue to try and achieve seizure control because of the potential for injury and illness due to seizures. In a very small minority of patients with generalized tonic clonic seizures (\"grand mal\"), breathing or heart function can stop during a seizure and result in demise (sudden unexpected death in epilepsy or SUDEP). Nicholson from seizures prevents this kind of outcome-     Please continue taking levetiracetam (Keppra) 500mg twice a day.  At higher doses, some people experience drowsiness or irritability. Not something that I'd expect on your dose, but let me know if this is something you experience.     I have ordered blood work " for you to have completed at the lab. You do not need to bring any paperwork with you if you are going to a [facility]. The results will automatically come to me and I will call or message you with results.      GET PROMPT MEDICAL ATTENTION (emergecy evaluatoin) if any of the following occur:  · Seizures occurring more often or becoming longer than usual  · Seizure lasting over 5 minutes  · Seizures happen back to back (cluster) where in between each seizure you do not regain normal consciousness  · Remaining confused for more than 30 minutes after a seizure  · Injury during a seizure or medically unstable (not breathing, no pulse)  · Fever over 100.4ºF (38.0ºC)     SEIZURE PRECAUTIONS      Bathroom Safety   A person with seizures may want to shower instead of bathe to avoid accidental drowning. If falls occur during the patient's typical seizure, a person should use a shower seat, preferably one with a safety strap.   Use nonskid strips in your shower or tub.   Never use electrical equipment near water. This prevents accidental electrocution.   Consider changing glass in shower doors to shatterproof glass.   Kitchen Safety   If possible, cook when someone else is nearby.   Use the back burners of the stove to prevent accidental burns.   Use shatterproof containers as much as possible. For instance, sauces can be transferred from glass bottles to plastic containers for use.   Limit time that is required using knives or other sharp objects. If possible, buy foods that are already cut, or ask someone to help in meal preparation.   General Safety at Home   Do not smoke or light fires in the fireplace unless someone else is present.   Do not use space heaters that can be accidentally overturned.   When alone, avoid using step stools or ladders, and do not clean rooftop gutters.   Purchase power tools and motorized lawn equipment which have a safety switch that will stop the machine if you release the handle  (a 'dead man's' switch).   Driving and Transportation   Avoid driving unless your seizures are well controlled and/or you have permission to drive from your state's Department of Motor Vehicles   (DMV). Each state has different laws. Please refer to the following link on the [employer] of [place]'s website for more information: http://www.epilepsyfoundation.org/answerplace/Social/driving/drivingu.cfm   If you ride a bicycle, wear a helmet and any other necessary protective gear.   When taking public transportation like the bus or subway, stay clear of the platform edge.   Outdoor and Sports Safety   Swimming is okay, but does present certain risks. Never swim alone, and tell friends what to do if you have a seizure while swimming.   Wear appropriate protective equipment.   Ski with a friend. If a seizure occurs, your friend can seek help, if needed. He or she can also help to get you out of the cold. Consider using a safety hook or belt while riding the ski lift.       The [facility]  <http://www.epilepsyinfo.org/>  phone number: 769.513.7773.         [facility]   [phone]  https://[facility].org  Info@[facility].org  [address], [place] [address]

## 2025-07-18 NOTE — PROGRESS NOTES
Virtual or Telephone Consent    While technically available,  unable or unwilling to consent to connect via audio/video telehealth technology; therefore, I performed this visit using a real-time audio only connection between Venessa Duran & SUSAN Quzeada.  Verbal consent was requested and obtained from Venessa Duran on this date, 07/18/25 for a telehealth visit and the patient's location was confirmed at the time of the visit.      Venessa Duran is a 30 y.o. year old    female who presents for follow up after ER for seizure 6/11    Onset (date):  4/27/2025    Evolution and Type(s):  The patient reports first time and only seizures. It happened when she was sleeping. There was no aura. She woke up in the ambulance, feeling sore and confused.  She was told by her  that he heard her choking, and saw her shaking in all extremities with foaming through her mouth and not breathing after the seizure stop. There was no tongue biting or urinary incontinence.   She states she drank about 3 mix drinks the night before.     PRESENT CONCERNS:   Was taken to the ED on 6/11 for 2nd seizure her  witnessed this one as well. She was sleeping,  heard a choking noise and woke to found her twitching/jerking. Lasts about 3 minutes - confused and doesn't know who he is for a few minutes upon waking. By the time the ambulance arrives she is coming too and knows what's going on.   Started on -500 in the ED overall feeling ok, does note more fatigue. She did stop wellbutrin after her first seizure in April.       Risk Factors (including gestational/birth history):  The patient was the product of a normal spontaneous vaginal delivery of a full term birth. 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  There was no history of febrile convulsions or CNS infections.    Development was normal and developmental milestones were up to par with age.    No history of head trauma  with loss of consciousness.   There are no other risk factors for epilepsy.     Past Antiepileptic medication: none    Current Antiepileptic Medications:  -500    Other medications: Semaglutide, Wellbutrin,  prozac    MR brain w and wo IV contrast  Result Date: 5/21/2025  1. Normal brain MRI examination for the patient's stated age.   MACRO: None   Signed by: Marky Johnson 5/21/2025 1:20 PM Dictation workstation:   [id number]     EEG  Result Date: 5/9/2025  IMPRESSION Impression This routine EEG is normal. However, a prolonged EEG may help clarify the EEG finding above. No epileptiform discharges or lateralizing signs are seen. A full report will be scanned into the patient's chart at a later time. This report has been interpreted and electronically signed by       Social History:   , lives with her , no kids  Drinks alcohol occasionally on weekends (1/month)  Denies tobacco or drugs use  Currently not driving    RX Allergies[1]     Review of Systems  As per HPI, otherwise all other systems have been reviewed are negative for complaint.           Objective   There were no vitals taken for this visit.    Neurological Exam  This examination was performed over telehealth by telephone discussion--Patient is alert and oriented. Speech is fluid, without dysarthria. Able to appropriately give information about past history and current events. Further objective neurological testing not possible given nature of phone interview.      Results  Encounter Date: 06/11/25   ECG 12 lead   Result Value    Ventricular Rate 111    Atrial Rate 111    NM Interval 138    QRS Duration 86    QT Interval 350    QTC Calculation(Bazett) 476    P Axis 39    R Axis 49    T Axis 25    QRS Count 18    Q Onset 216    P Onset 147    P Offset 200    T Offset 391    QTC Fredericia 429    Narrative    Sinus tachycardia  Possible Left atrial enlargement  Borderline ECG  When compared with ECG of 27-APR-2025 12:26,  No significant  change was found  Confirmed by Patrick Marin ([id number]) on 6/11/2025 8:50:10 PM         Assessment/Plan     30years old right handed woman with now 2nd seizure who presents for follow up.     4D Classification  EPILEPTIC Paroxysmal Events  EZ: unknown  Semiology: Generalized tonic clonic seizure  - Frequency: 2x  History of Status Epilepticus: no  Etiology: Alcohol related  Comorbidities: Anxiety, cystic fibrosis, kidney stones    Venessa has now had 2 seizures and meets the diagnosis of epilepsy. Based on description her seizures were most likely generalized tonic-clonic.  Today we covered the need to stay on Anti-seizure medication, common triggers and further testing including advanced seizure protocol MRI and prolonged vEEG. Once MRI resulted will determine the need for vEEG. Will obtain LEV serum level to ensure therapeutic dose. Covered seizure precautions, no driving until 6 months seizure free (Dec 2025). Has nayzilam from ED for seizure rescue     Plan:  Continue -500  LEV serum level   Epilepsy protocol MRI scheduled for 7/27  The patient was asked to make a follow-up appointment in  4-6  months  The patient was asked to call if there was an exacerbation of the seizures or any side effects from the antiepileptic medication.             The total appointment time today was 40 minutes. Time included preparing to see the patient, obtaining the history, performing a medically necessary appropriate physical examination, counseling and educating the patient/family/caregiver, ordering medications, tests and procedures, referring and communicating with other providers, independently interpreting results and communicating the results to the patient/family/caregiver, care coordination] and documenting clinical information in the medical record.               [1]   Allergies  Allergen Reactions    Cat Dander Runny nose    Ciprofloxacin Nausea/vomiting     Violent emesis with cipro.    House Dust Runny nose

## 2025-07-21 ENCOUNTER — OFFICE VISIT (OUTPATIENT)
Dept: OBSTETRICS AND GYNECOLOGY | Facility: CLINIC | Age: 30
End: 2025-07-21
Payer: COMMERCIAL

## 2025-07-21 ENCOUNTER — APPOINTMENT (OUTPATIENT)
Dept: OBSTETRICS AND GYNECOLOGY | Facility: CLINIC | Age: 30
End: 2025-07-21
Payer: COMMERCIAL

## 2025-07-21 VITALS
HEART RATE: 90 BPM | DIASTOLIC BLOOD PRESSURE: 88 MMHG | SYSTOLIC BLOOD PRESSURE: 130 MMHG | BODY MASS INDEX: 33.13 KG/M2 | WEIGHT: 187 LBS

## 2025-07-21 DIAGNOSIS — Z30.09 BIRTH CONTROL COUNSELING: ICD-10-CM

## 2025-07-21 DIAGNOSIS — Z80.3 FAMILY HISTORY OF BREAST CANCER: Primary | ICD-10-CM

## 2025-07-21 DIAGNOSIS — Z11.3 ROUTINE SCREENING FOR STI (SEXUALLY TRANSMITTED INFECTION): ICD-10-CM

## 2025-07-21 DIAGNOSIS — Z01.419 ENCOUNTER FOR ANNUAL ROUTINE GYNECOLOGICAL EXAMINATION: ICD-10-CM

## 2025-07-21 PROCEDURE — 99395 PREV VISIT EST AGE 18-39: CPT | Performed by: ADVANCED PRACTICE MIDWIFE

## 2025-07-21 PROCEDURE — 1036F TOBACCO NON-USER: CPT | Performed by: ADVANCED PRACTICE MIDWIFE

## 2025-07-21 RX ORDER — NORETHINDRONE ACETATE AND ETHINYL ESTRADIOL 1MG-20(24)
1 KIT ORAL DAILY
Qty: 84 TABLET | Refills: 3 | Status: SHIPPED | OUTPATIENT
Start: 2025-07-21

## 2025-07-21 SDOH — ECONOMIC STABILITY: FOOD INSECURITY: WITHIN THE PAST 12 MONTHS, THE FOOD YOU BOUGHT JUST DIDN'T LAST AND YOU DIDN'T HAVE MONEY TO GET MORE.: NEVER TRUE

## 2025-07-21 SDOH — ECONOMIC STABILITY: INCOME INSECURITY: IN THE LAST 12 MONTHS, WAS THERE A TIME WHEN YOU WERE NOT ABLE TO PAY THE MORTGAGE OR RENT ON TIME?: NO

## 2025-07-21 SDOH — ECONOMIC STABILITY: FOOD INSECURITY: WITHIN THE PAST 12 MONTHS, YOU WORRIED THAT YOUR FOOD WOULD RUN OUT BEFORE YOU GOT MONEY TO BUY MORE.: NEVER TRUE

## 2025-07-21 SDOH — ECONOMIC STABILITY: TRANSPORTATION INSECURITY
IN THE PAST 12 MONTHS, HAS THE LACK OF TRANSPORTATION KEPT YOU FROM MEDICAL APPOINTMENTS OR FROM GETTING MEDICATIONS?: NO

## 2025-07-21 SDOH — ECONOMIC STABILITY: TRANSPORTATION INSECURITY
IN THE PAST 12 MONTHS, HAS LACK OF TRANSPORTATION KEPT YOU FROM MEETINGS, WORK, OR FROM GETTING THINGS NEEDED FOR DAILY LIVING?: NO

## 2025-07-21 ASSESSMENT — COLUMBIA-SUICIDE SEVERITY RATING SCALE - C-SSRS
6. HAVE YOU EVER DONE ANYTHING, STARTED TO DO ANYTHING, OR PREPARED TO DO ANYTHING TO END YOUR LIFE?: NO
2. HAVE YOU ACTUALLY HAD ANY THOUGHTS OF KILLING YOURSELF?: NO
1. IN THE PAST MONTH, HAVE YOU WISHED YOU WERE DEAD OR WISHED YOU COULD GO TO SLEEP AND NOT WAKE UP?: NO

## 2025-07-21 ASSESSMENT — PATIENT HEALTH QUESTIONNAIRE - PHQ9
2. FEELING DOWN, DEPRESSED OR HOPELESS: NOT AT ALL
SUM OF ALL RESPONSES TO PHQ9 QUESTIONS 1 AND 2: 0
1. LITTLE INTEREST OR PLEASURE IN DOING THINGS: NOT AT ALL

## 2025-07-21 ASSESSMENT — ENCOUNTER SYMPTOMS
ENDOCRINE NEGATIVE: 0
CARDIOVASCULAR NEGATIVE: 0
EYES NEGATIVE: 0
NEUROLOGICAL NEGATIVE: 0
ALLERGIC/IMMUNOLOGIC NEGATIVE: 0
PSYCHIATRIC NEGATIVE: 0
GASTROINTESTINAL NEGATIVE: 0
HEMATOLOGIC/LYMPHATIC NEGATIVE: 0
MUSCULOSKELETAL NEGATIVE: 0
RESPIRATORY NEGATIVE: 0
CONSTITUTIONAL NEGATIVE: 0

## 2025-07-21 ASSESSMENT — LIFESTYLE VARIABLES
AUDIT-C TOTAL SCORE: 1
SKIP TO QUESTIONS 9-10: 1
HOW OFTEN DO YOU HAVE A DRINK CONTAINING ALCOHOL: MONTHLY OR LESS
HOW OFTEN DO YOU HAVE SIX OR MORE DRINKS ON ONE OCCASION: NEVER
HOW MANY STANDARD DRINKS CONTAINING ALCOHOL DO YOU HAVE ON A TYPICAL DAY: 1 OR 2

## 2025-07-21 ASSESSMENT — PAIN SCALES - GENERAL: PAINLEVEL_OUTOF10: 0-NO PAIN
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
## 2025-07-22 LAB
C TRACH RRNA SPEC QL NAA+PROBE: NOT DETECTED
N GONORRHOEA RRNA SPEC QL NAA+PROBE: NOT DETECTED
QUEST GC CT AMPLIFIED (ALWAYS MESSAGE): NORMAL
T VAGINALIS RRNA SPEC QL NAA+PROBE: NOT DETECTED

## 2025-07-27 ENCOUNTER — HOSPITAL ENCOUNTER (OUTPATIENT)
Dept: RADIOLOGY | Facility: HOSPITAL | Age: 30
Discharge: HOME | End: 2025-07-27
Payer: COMMERCIAL

## 2025-07-27 DIAGNOSIS — R56.9 SEIZURE (MULTI): ICD-10-CM

## 2025-07-27 PROCEDURE — 70553 MRI BRAIN STEM W/O & W/DYE: CPT | Performed by: RADIOLOGY

## 2025-07-27 PROCEDURE — 2550000001 HC RX 255 CONTRASTS: Mod: JW | Performed by: NURSE PRACTITIONER

## 2025-07-27 PROCEDURE — A9575 INJ GADOTERATE MEGLUMI 0.1ML: HCPCS | Mod: JW | Performed by: NURSE PRACTITIONER

## 2025-07-27 PROCEDURE — 70553 MRI BRAIN STEM W/O & W/DYE: CPT

## 2025-07-27 RX ORDER — GADOTERATE MEGLUMINE 376.9 MG/ML
15 INJECTION INTRAVENOUS
Status: COMPLETED | OUTPATIENT
Start: 2025-07-27 | End: 2025-07-27

## 2025-07-27 RX ORDER — GADOTERATE MEGLUMINE 376.9 MG/ML
INJECTION INTRAVENOUS
Status: CANCELLED | OUTPATIENT
Start: 2025-07-27

## 2025-07-27 RX ADMIN — GADOTERATE MEGLUMINE 13 ML: 376.9 INJECTION INTRAVENOUS at 09:51

## 2025-07-29 ENCOUNTER — TELEMEDICINE (OUTPATIENT)
Dept: PEDIATRIC PULMONOLOGY | Facility: HOSPITAL | Age: 30
End: 2025-07-29
Payer: COMMERCIAL

## 2025-07-29 DIAGNOSIS — R56.9 SEIZURE-LIKE ACTIVITY (MULTI): ICD-10-CM

## 2025-07-29 DIAGNOSIS — E84.8 EXOCRINE PANCREATIC MANIFESTATION OF CYSTIC FIBROSIS (MULTI): ICD-10-CM

## 2025-07-29 DIAGNOSIS — E84.9 CYSTIC FIBROSIS: Primary | ICD-10-CM

## 2025-07-29 PROCEDURE — 99214 OFFICE O/P EST MOD 30 MIN: CPT | Performed by: PEDIATRICS

## 2025-07-29 RX ORDER — LEVOFLOXACIN 750 MG/1
750 TABLET, FILM COATED ORAL DAILY
Qty: 10 TABLET | Refills: 0 | Status: SHIPPED | OUTPATIENT
Start: 2025-07-29 | End: 2025-08-08

## 2025-07-29 NOTE — PROGRESS NOTES
Alvina Pelletier M.D. [facility]    Virtual or Telephone Consent    An interactive audio and video telecommunication system which permits real time communications between the patient (at the originating site) and provider (at the distant site) was utilized to provide this telehealth service.   Verbal consent was requested and obtained from Venessa THOMASON Renee on this date, 07/29/25 for a telehealth visit and the patient's location was confirmed at the time of the visit.      Plan from last visit:  Stop the blue pill - switched the blue and orange pills, stopped the blue pill for only a few days but felt different  Let me know how this goes  Nifedipine to pharmacy - did not start, due to the scary nature of what is going on  Referral to Neuropsych - November of this year  Get  tested for CF  Annual labs including OGTT - done  Tdap, PCV20 - not done yet  Liver ultrasound - done, and normal       HPI    Your Annual Labs were labs were last done: 04/2025  Annual Labs are next due: 04/2026   Your last CXR was done: 11/2022  Your last DEXA scan was on: 11/2024  DEXA is next due: 11/2029  Your last audiology screening was: 9/2019      HPI  Interval history:     Two seizures since last appt  Started Keppra  Feeling tired    First MRI to rule out tumors, bleeds, etc.  Small finding of signal activity  More in depth MRI on Sunday, stuff was found  Restricted from driving right now, having a struggle with this  Has to do this for another 5 months - through the end of the year    More nasal drainage lately  Was on nasal spray before surgeries, none since  Had surgeries 10 years ago  Nasal drainage started after Keppra, this started 6 weeks ago    When coughs, she feels like she's choking  Before Trikafta would feel this way  Has to take water to stop    More tired  Run down  Headache and nasal drainage    Keppra is safe for pregnancy per patient neurologist  Okay to get pregnant if she wants, but she's  not doing it right now due to health issues    Respiratory Review of Systems:  Cough: several coughing attacks  Sputum:  none  Hemoptysis: none  Chest Pain: none  Wheezing: none  Other Respiratory Symptoms: more nasal drainage  Oxygen: none  Transplant eval: none  exercise:    Airway Clearance:   none  If does aerosols, does albuterol and pulmozyme, not using        GI Review of Systems:  Enzymes: on enzymes   Vitamins: on multivitamin and vitamin D   Supplements:  none  Stools:    Other: stopped the GLP1 agonists        ENDO: no issues    Birth control:  OCP    Neuro:  + brain fog remains the same, Keppra might be worsening    Pain: no pain anywhere    Blood - the other day at GYN and it wasn't as high    Psych - prozac   Off on wellbutrin due to seizures  NO EMDR while under seizure protocol        Current Medications     Current Outpatient Medications   Medication Instructions    cetirizine (ZyrTEC) 10 mg capsule Take by mouth.    elexacaftor-tezacaftor-ivacaft (Trikafta) 100-50-75 mg tablet TAKE TWO (2) ORANGE TABLETS BY MOUTH IN THE MORNING AND ONE (1) BLUE TABLET BY MOUTH IN THE EVENING. TAKE 12 HOURS APART WITH FATTY FOODS.    famotidine (PEPCID) 20 mg, 2 times daily    FLUoxetine (PROZAC) 80 mg, oral, Daily    levETIRAcetam (KEPPRA) 500 mg, oral, 2 times daily    lipase-protease-amylase (Creon) 3,000-9,500- 15,000 unit capsule     multivitamin with minerals-folic acid-vitamin K-CoQ (DEKAs Plus) 200 mcg-1,000 mcg-10 mg capsule 1 capsule, Daily    NIFEdipine ER (ADALAT CC) 30 mg, oral, Daily before breakfast, Do not crush, chew, or split.    norethindrone-e.estradioL-iron (Blisovi 24 Fe) 1 mg-20 mcg (24)/75 mg (4) tablet 1 tablet, oral, Daily       Physical Examination     There were no vitals taken for this visit.    General Appearance: Well developed, well nourished. In no acute distress. Alert and oriented, to person place and time  HEENT: Normocephalic, atraumatic. Sclerae white, Extraocular movements  "intact.   Neck: Soft, no deformities  Respiratory: Breathing comfortably on room air  Back: No deformities  Psychiatric: Appropriate orientation, mood, and affect  Neurological: Nonfocal. Grossly intact.  Extremities: Nonfocal. Grossly intact.  Skin: No visible lesions        CF Sputum Culture     No results found for: \"AFBCX\"   Respiratory Culture, Cystic Fibrosis   Date/Time Value Ref Range Status   04/02/2025 04:16 PM (2+) Few Normal throat kaushik  Final   11/06/2024 01:24 PM (4+) Abundant Normal throat kaushik  Final   11/06/2024 01:24 PM (1+) Rare Pseudomonas aeruginosa mucoid (A)  Final       Spirometry:  11/06/2024  FVC 4.27 119%/ FEV1 3.45 113%/ 0.81 94%        Problem List Items Addressed This Visit       Cystic fibrosis - Primary    Overview   Primary Cystic Fibrosis Physician: Dr. Mary Marie   Primary Care Physician: Dr. Alexus Lawrence     Initial Diagnosis: 3/27/1995 sweat chloride, 103  Genotype: [id number] / [id number]    CFTR modulator: Trikafta  no longer taking Zithromax  high dose ibuprofen: no    Respiratory: 02/22/24  - Baseline FEV1: 110.8% 01-03-24 in lab RBC  - Airway clearance: aerobika, exercise - walks dog most days  - Hypertonic saline: No  - Pulmozyme: yes Once Day  - Inhaled antibiotics: No (discontinued tobramycin)  - Other inhaled meds: yes albuterol solution  - Oxygen: No  Recommendation/Summary: Daily exercise and/or airway clearance. Consider [place] if needed in the future for pseudomonas. (patient reported ringing in right ear, diminished hearing)      History: pyelonephritis 2012  Hemoptysis: One episode likely due to sinuses   Pneumothorax: No  Sinus surgery: b/l endoscopic sinus surgery performed 3/19/15; septoplasty/inferior turbinate outfracture performed  CARLOS:   DVT: No  Fertility Issues: On birth control, has normal periods with this. July 2019 long discussion in regards to fertility and having children in the future when she's ready. Would like to talk to genetic " counseling;     Procedures & Oral/IV course:  - has never required admission with IV abx for lungs  - admission inpatient 1/29-1/31/20 due to influenza B --> IVF, no IV abx  - oral abx for exacerbation: Levaquin (does not tolerate Cipro because it makes her sick)         Exocrine pancreatic manifestation of cystic fibrosis (Multi)    Seizure-like activity (Multi)              # Cystic fibrosis with exacerbation  - will start Levaquin for 10 days  - send message in two weeks with follow up - how is she feeling?  - grows PSA intermittently, as well as haemophilus and staph     ______________________________________________________________________________________  sick plan:  - can do doxy or Levaquin with good results  ______________________________________________________________________________________     # seizure disorder - unknown etiology  - ? Trikafta?  - stopped wellbutrin  - discussed levaquin in face of it decreasing the seizure threshold.  Her neurologist said short term antibiotics are okay.  We discussed that she's on the Keppra and this should protect her.    #chest pain - stress test in 2024 negative  - no longer happening    # weight gain  - was taking compounded GLP1  - has stopped the GLP! Agonist for now        # anxiety   - started seeing a psych NP  - still not controlled     #. exocrine pancreatic insufficiency  - enzymes     # Right lower abdominal pain  - better     #. Osteopenia  - due for a DEXA scan     # high blood pressure without the diagnosis of HTN  - will start Nifedipine in the event that she is going to get pregnant  - hasn't started yet (7.29) but thinking about this as her health stabilizes after the seizures    # family history of breast cancer   - start mammograms at 34  - discussed MRI screening      # derm  - gets skin cancer checks yearly    # brain fog  - refer to neuropsych - she feels she may have ADHD and wants a second opinion  - has appt scheduled for  November    # considering starting a family  - refer  for CF carrier testing       Plan for this visit:  Levaquin 750 10 days, report back  Tdap, pneumovax  Return in 3 months  Nifedipine for BP, when she feels comfortable  Appt with neuropsych in Nov    Spent 41 minutes on patient care, was telehealth visit      Follow-up:  Visit date not found     Mary Marie MD PhD   07/29/2025

## 2025-07-29 NOTE — PATIENT INSTRUCTIONS
"    It was very nice to see you today. We can offer you in-person and \"virtual\" appointments with your cystic fibrosis provider.    If you need to cancel or schedule an appointment, please call the  at the [facility][note 3] at [phone] between the hours of 8 AM and 5 PM, Monday-Friday.    To reach the CF nurse, Melina, please call [phone]. Melina has a secure voice mail account if you want to leave a message.    If you need to speak with an adult cystic fibrosis provider between the hours of 5 PM and 8 AM (overnight) or anytime on Saturday or Sunday, please call [phone]. When you call this number, you will be connected to an  with the [facility] and [facility] answering service. You should tell the  that you're an adult with cystic fibrosis who needs to speak to the \"pediatric pulmonary doctor on-call.\" The  will take your contact information. You should then expect a call back from the cystic fibrosis doctor on-call within a short period of time.          Experience of Care (XoC) Survey - We want to hear from you!     We strive to provide the best possible care for our patients.  Please consider scanning the QR code below and giving feedback on your visit today.  Your thoughts, feelings, or concerns about your visit are important to us.  Providing this information will help us create a better CF center for everyone we serve.  We appreciate your time - your feedback is a gift! - McB              Plan for this visit:   Levaquin 750 10 days, report back  Tdap, pneumovax  Return in 3 months  Nifedipine for BP, when she feels comfortable  Appt with neuropsych in Nov          Next appointment: Visit date not found    "

## 2025-08-01 DIAGNOSIS — G40.109 FOCAL EPILEPSY (MULTI): Primary | ICD-10-CM

## 2025-08-01 NOTE — PROGRESS NOTES
29 yo right handed woman who has had 2 seizures while sleeping,  making a diagnosis of Epilepsy.     4D Classification  EPILEPTIC Paroxysmal Events  EZ: unknown  Semiology: Generalized tonic clonic seizure  - Frequency: once  History of Status Epilepticus: no  Etiology: Alcohol related  Comorbidities: Anxiety, cystic fibrosis, kidney stones    MR brain w and wo IV contrast  Result Date: 7/28/2025  No acute intracranial findings.   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   Bilateral middle cranial fossa arachnoid granulations, left greater than right.   MACRO: None   Signed by: Ian Martinez 7/28/2025 10:27 AM Dictation workstation:   [id number]    MR brain w and wo IV contrast  Result Date: 5/21/2025  1. Normal brain MRI examination for the patient's stated age.   MACRO: None   Signed by: Marky Johnson 5/21/2025 1:20 PM Dictation workstation:   [id number]    EEG  Result Date: 5/9/2025  IMPRESSION Impression This routine EEG is normal. However, a prolonged EEG may help clarify the EEG finding above. No epileptiform discharges or lateralizing signs are seen. A full report will be scanned into the patient's chart at a later time. 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

## 2025-08-13 PROCEDURE — RXMED WILLOW AMBULATORY MEDICATION CHARGE

## 2025-08-14 ENCOUNTER — PHARMACY VISIT (OUTPATIENT)
Dept: PHARMACY | Facility: CLINIC | Age: 30
End: 2025-08-14
Payer: COMMERCIAL

## 2025-08-29 ENCOUNTER — APPOINTMENT (OUTPATIENT)
Dept: NEUROLOGY | Facility: HOSPITAL | Age: 30
End: 2025-08-29
Payer: COMMERCIAL

## 2025-11-17 ENCOUNTER — APPOINTMENT (OUTPATIENT)
Dept: NEUROLOGY | Facility: HOSPITAL | Age: 30
End: 2025-11-17
Payer: COMMERCIAL

## 2025-11-18 ENCOUNTER — APPOINTMENT (OUTPATIENT)
Dept: DERMATOLOGY | Facility: CLINIC | Age: 30
End: 2025-11-18
Payer: COMMERCIAL